# Patient Record
Sex: MALE | Race: WHITE | NOT HISPANIC OR LATINO | Employment: OTHER | URBAN - METROPOLITAN AREA
[De-identification: names, ages, dates, MRNs, and addresses within clinical notes are randomized per-mention and may not be internally consistent; named-entity substitution may affect disease eponyms.]

---

## 2023-08-18 ENCOUNTER — TRANSFERRED RECORDS (OUTPATIENT)
Dept: HEALTH INFORMATION MANAGEMENT | Facility: CLINIC | Age: 73
End: 2023-08-18

## 2023-09-18 ENCOUNTER — TELEPHONE (OUTPATIENT)
Dept: TRANSPLANT | Facility: CLINIC | Age: 73
End: 2023-09-18
Payer: MEDICARE

## 2023-09-18 DIAGNOSIS — C90.00 MULTIPLE MYELOMA, REMISSION STATUS UNSPECIFIED (H): Primary | ICD-10-CM

## 2023-10-18 ENCOUNTER — CARE COORDINATION (OUTPATIENT)
Dept: TRANSPLANT | Facility: CLINIC | Age: 73
End: 2023-10-18
Payer: MEDICARE

## 2023-10-18 NOTE — PROGRESS NOTES
[facility]MT [facility] Cell Therapy [facility]  RN Coordinator Pre-Visit Documentation      Dutch Mckeon is a 73 year old male who has been referred to the [facility] BMT and Cell Therapy Program for hematopoietic cell transplant or immune effector cell therapy.      Referring MD Name: Dr. Romero      Reason for referral: MM, auto transplant    Link to BMT & CT Program Algorithms      All relevant clinical notes, labs, imaging, and pathology may be reviewed in Epic Bookmarks under name: Micaela Oliveira      Patient Care Team         Relationship Specialty Notifications Start End    James Meyer MD PCP - General   10/17/23     Phone: [phone] Fax: [phone]         [address] [place] [address]              Micaela Oliveira RN

## 2023-10-19 PROBLEM — G95.89 SPINAL CORD MASS (H): Status: ACTIVE | Noted: 2023-08-12

## 2023-10-19 PROBLEM — C90.30 PLASMACYTOMA (H): Status: ACTIVE | Noted: 2023-08-12

## 2023-10-19 PROBLEM — E78.5 HYPERLIPIDEMIA: Status: ACTIVE | Noted: 2023-08-15

## 2023-10-19 PROBLEM — I10 HYPERTENSIVE DISORDER: Status: ACTIVE | Noted: 2023-08-12

## 2023-10-19 PROBLEM — C90.00 MULTIPLE MYELOMA (H): Status: ACTIVE | Noted: 2023-09-01

## 2023-10-19 PROBLEM — M10.9 GOUT: Status: ACTIVE | Noted: 2023-08-15

## 2023-10-19 PROBLEM — S24.109A INJURY OF THORACIC SPINAL CORD (H): Status: ACTIVE | Noted: 2023-08-15

## 2023-10-19 NOTE — PROGRESS NOTES
BMT Consultation    Dutch Mckeon is a 73 year old male referred by Dr. 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    Oncology History   Multiple myeloma (H)   8/18/2023 -  Cancer Staged    Staging form: Plasma Cell Myeloma and Disorders, AJCC 8th Edition  - Clinical stage from 8/18/2023: RISS Stage I (Beta-2-microglobulin (mg/L): 2, Albumin (g/dL): 3.9, ISS: Stage I, High-risk cytogenetics: Absent, LDH: Normal)     9/1/2023 Initial Diagnosis    Multiple myeloma (H)         Hematologic history:  Multiple myeloma RISS stage I, IgG lambda, standard risk cytogenetics.      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    The pt p/w B/L LE weakness and inability to bear his weight to his local ED on 8/15/23. He states that this was an acute progression of symptoms in his LEs, unable to walk or to bear weight within a day. His symptoms were a/w tinglings, numbness from waist down. After he went to the hospital, he also had both urinary and bowel incontinence. He states that the onset was he was playing golf 1 wk prior, but he recalls that his balance was not as normal since April. Looking back, he states that he has intermittent intercostal numbness/tinglings across his waist on the L side since February this yr, which he was treated with steroids prn and PT (however, due to the progressive nature of the symptoms, he had been scheduled to get and MRI, which did not happen because he ended up coming to the hospital).     He underwent w/up with MRI spine (8/12/2023) that showed a Large homogeneous enhancing multilobular spinous/paraspinous mass centered at the posterior elements of T6, with additional involvement of T5/T7, paraspinous and adjacent ribs,  transforaminal extension and epidural invasion/involvement. Mass effect results in severe spinal stenosis T5-upper T7 level as well as severe foraminal compromise T5-T6 and T6-T7 right more prominent than left, that was concerning for an aggressive/destructive neoplastic process with primary consideration of metastasis, or primary bone tumor including plasmacytoma, chordoma or lymphoma. The MRI also showed an abnormal nonenhancing intramedullary T2 hyperintensity/edema involves the T5-T7 level cord related to compressive effects of the soft tissue mass/neoplasm. He was treated with radiation, his symptoms got better and he established care with an hematologist/oncologist.     While he was in the hospital, he underwent BMBx on 8/21, that showed bone marrow involvement of plasma cell 10-15%, and neg for clonal chromosome abnl findings. FISH showed Positive for a trisomy result with gains of chromosomes 5, 9, and 15 suggesting hyperdiploidy, it was neg for remaining panel probes. Peripheral labs showed M protein of 1.95 IgG Lambda, . UPEP also positive for monoclonal protein, lambda LC of 21 with ratio of 0.04.     Currently, he states that his weakness in B/L LE have improved significantly (but has recovered about 80%), but he still has residual tingling and numbness in his feet (he had this his waist down, which have improved mostly) that makes him still unsteady. Due to this, he uses a walker currently.     At baseline, he used to walk 8-9 miles a day before all this happened. He used to play golf 5 days a wk.     ROS positive for constipation. The patient does not report any SOB, CP, abd pain/n/v/d, dysuria, joint pain, rash, or fever.     He states he has been tolerating his D-Rvd pretty well. No fever complication, no rash, no GI symptoms. Still eating well. His only symptom is some achyness around his ribs, which that not even bother him much.     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 He is schedule  to to get additional surgery on 11/3/2023.     Currently on metformin, and glipizide was just added after he was started on chemo regimen. Recent A1c of 8.3 in 8/14/23. He is ppx with bactrim and acyclovir.     referred by Dr. Brown    ASSESSMENT AND PLAN:  Standard risk multiple myeloma-Mr. Mckeon presented with a fairly dramatic complication of his multiple myeloma with impending cord compression.  Fortunately with aggressive upfront therapy and radiotherapy, he is regaining function and feels that his strength is almost returned to normal but he is continuing to use a walker due to unsteadiness.  He is tolerating therapy quite well and there is evidence based on monitoring labs that he is responding well.  Today we reviewed with him the role of high-dose melphalan and peripheral blood stem cell transplant.  We discussed the primary benefit of a prolonged period of disease-free and progression free survival that is typically associated with excellent quality life.  We discussed the downsides which would be significant chemotherapy toxicity affecting the GI tract, peripheral blood counts, with fatigue and malaise lasting approximately 4 to 6 weeks.  We discussed the logistics of collecting peripheral blood stem cells, and moving ahead to high-dose chemotherapy thereafter.  We also discussed the options which could include ongoing treatment with his current regimen followed by a maintenance approach.  After reviewing the risk and benefits, he indicated a wish to proceed.  We would recommend that he receive at least 4 months of his current regimen before proceeding.  We also discussed that this would align with the Janet holiday season and so it may be reasonable to continue his regimen through December with anticipated timing of stem cell collection followed by treatment in January.  By the end of the visit, we had answered his questions and addressed any concerns that he had.  We will hope to see him back in  January.    ROS:    10 point ROS neg other than the symptoms noted above in the HPI.        Past Medical History:   Diagnosis Date    Basal cell cancer     nose    Gout     Hyperlipidemia     Kidney stone     Type 2 diabetes mellitus without complications (H)        Past Surgical History:   Procedure Laterality Date    ENT SURGERY      tonsillectomy    EXCISE LESION HEAD Left 11/13/2015    Procedure: EXCISE LESION HEAD;  Surgeon: Amadeo Liu MD;  Location: [facility]    EXCISE LESION LOWER EXTREMITY  3/21/2014    Procedure: EXCISE LESION LOWER EXTREMITY;  EXCISION LESION RIGHT FOOT FOURTH WEB SPACE;  Surgeon: Amadeo Liu MD;  Location: [facility]    SOFT TISSUE SURGERY      mohs to nose       Fhx:  Father: lung cancer (was a smoker), skin Ca  Mother: perforated colon (non-Ca)  Brother: prostate Ca      Social History     Tobacco Use    Smoking status: Never   Substance Use Topics    Alcohol use: Yes    Drug use: No   Etoh: 2 glasses of wine      No Known Allergies     Current Outpatient Medications   Medication Sig Dispense Refill    ALLOPURINOL PO Take 50 mg by mouth daily      ASPIRIN EC PO Take 81 mg by mouth daily      ATORVASTATIN CALCIUM PO Take 1 tablet by mouth daily      Ezetimibe (ZETIA PO) Take 1 tablet by mouth daily      METFORMIN HCL PO Take 1,000 mg by mouth 2 times daily (with meals)       multivitamin, therapeutic with minerals (MULTI-VITAMIN) TABS Take 1 tablet by mouth daily      VITAMIN D, CHOLECALCIFEROL, PO Take 1,000 Units by mouth daily         KPS:  80    Physical Exam:     Physical Exam  Constitutional:       General: He is not in acute distress.     Appearance: Normal appearance. He is not ill-appearing.   HENT:      Nose: Nose normal. No congestion.      Mouth/Throat:      Mouth: Mucous membranes are moist.   Cardiovascular:      Rate and Rhythm: Normal rate and regular rhythm.      Pulses: Normal pulses.      Heart sounds: Normal heart sounds. No murmur  heard.  Pulmonary:      Effort: Pulmonary effort is normal.      Breath sounds: Normal breath sounds.   Musculoskeletal:         General: No swelling. Normal range of motion.   Skin:     General: Skin is warm.      Findings: No rash.   Neurological:      General: No focal deficit present.      Mental Status: He is alert.   Psychiatric:         Mood and Affect: Mood normal.         Behavior: Behavior normal.         Thought Content: Thought content normal.         Judgment: Judgment normal.            Labs, testing:     CBC on 8/12/23: WBC 5.1, Hb 12.3, plt 254  Beta2 microglobulin 2.03  Albumin 4.1    Lambda FLC 21.33  Kappa FLC 0.78  Ratio 0.04  M protein 1.95g  IGG 2677  IgM 102  IgA 62    10/3/2023  Lambda FLC 1.09  Kappa FLC 0.74  Ratio 0.68  M protein 0.27g      TEST & RESULT SUMMARY Chromosome Analysis:   Negative for a clonal chromosome abnormality.    Plasma Cell Dyscrasia FISH Panel:   Positive for a trisomy result with gains of chromosomes 5, 9, and 15 suggesting hyperdiploidy. Negative for remaining panel probes.       Bone marrow biopsy 8/21/2023       Final Diagnosis   BONE MARROW AND PERIPHERAL BLOOD:   1.  Plasma cell myeloma (WHO 2016)       a.  Bone marrow involvement:  10-15%       b.  Bone marrow cellularity:  50% (moderately hypercellular with maturing trilineage hematopoiesis)       c.  Peripheral blood:           - No circulating plasma cells           - Minimal normocytic anemia   2.  M-spike: 1.95 g/dl IgG lambda   3.  Chromosome and FISH (myeloma panel) studies pending; appended results to follow   4.  See comment   Electronically signed by Virginia Cummings MD on 8/21/2023 at 10:20 AM   Comment     Clinical correlation is required to evaluate for any evidence of myeloma-related end organ damage in order to distinguish between smoldering (asymptomatic) plasma cell myeloma and (symptomatic) plasma cell myeloma.      Chromosome Analysis:   Negative for a clonal chromosome  abnormality.     Plasma Cell Dyscrasia FISH Panel:   Positive for a trisomy result with gains of chromosomes 5, 9, and 15 suggesting hyperdiploidy. Negative for remaining panel probes.       TODAY'S ASSESSMENT BY SYSTEMS     Dutch understood the above assessment and recommendations.  Multiple questions answered.  No barriers to learning identified.       Known issues that I take into account for medical decisions, with salient changes to the plan considering these complexities noted above.    Patient Active Problem List   Diagnosis    AC separation, type 2    Diabetes mellitus without complication (H)    Gout    Hyperlipidemia    Hypertensive disorder    Injury of thoracic spinal cord (H)    Multiple myeloma (H)    Plasmacytoma (H)    Spinal cord mass (H)       Today's summary: He will continue 4-5 months of treatment and then return for HD-Idania and transplant    I spent 90 minutes in the care of this patient today, which included time necessary for preparation for the visit, obtaining history, ordering medications/tests/procedures as medically indicated, review of pertinent medical literature, counseling of the patient, communication of recommendations to the care team, and documentation time.    GABRIEL MEYERS MD  October 19, 2023        ------------------------------------------------------------------------------------------------------------------------------------------------    Patient Care Team         Relationship Specialty Notifications Start End    James Meyer MD PCP - General   10/17/23     Phone: [phone] Fax: [phone]         [address] [place] [address]

## 2023-10-20 ENCOUNTER — OFFICE VISIT (OUTPATIENT)
Dept: TRANSPLANT | Facility: CLINIC | Age: 73
End: 2023-10-20
Attending: INTERNAL MEDICINE
Payer: MEDICARE

## 2023-10-20 VITALS
DIASTOLIC BLOOD PRESSURE: 66 MMHG | SYSTOLIC BLOOD PRESSURE: 114 MMHG | RESPIRATION RATE: 20 BRPM | BODY MASS INDEX: 28.42 KG/M2 | TEMPERATURE: 98 F | WEIGHT: 203 LBS | OXYGEN SATURATION: 98 % | HEART RATE: 63 BPM | HEIGHT: 71 IN

## 2023-10-20 DIAGNOSIS — Z71.9 VISIT FOR COUNSELING: Primary | ICD-10-CM

## 2023-10-20 DIAGNOSIS — C90.00 MULTIPLE MYELOMA, REMISSION STATUS UNSPECIFIED (H): Primary | ICD-10-CM

## 2023-10-20 PROCEDURE — 99417 PROLNG OP E/M EACH 15 MIN: CPT

## 2023-10-20 PROCEDURE — G0463 HOSPITAL OUTPT CLINIC VISIT: HCPCS

## 2023-10-20 PROCEDURE — 99205 OFFICE O/P NEW HI 60 MIN: CPT

## 2023-10-20 RX ORDER — PANTOPRAZOLE SODIUM 40 MG/1
TABLET, DELAYED RELEASE ORAL
COMMUNITY
Start: 2023-08-23 | End: 2024-01-10

## 2023-10-20 RX ORDER — ACYCLOVIR 400 MG/1
1 TABLET ORAL 2 TIMES DAILY
COMMUNITY
Start: 2023-08-23

## 2023-10-20 RX ORDER — ACETAMINOPHEN 500 MG
TABLET ORAL
COMMUNITY
Start: 2023-08-23 | End: 2024-01-10

## 2023-10-20 RX ORDER — DEXAMETHASONE 4 MG/1
TABLET ORAL
COMMUNITY
Start: 2023-09-08 | End: 2024-01-10

## 2023-10-20 RX ORDER — BORTEZOMIB 3.5 MG/1
INJECTION, POWDER, LYOPHILIZED, FOR SOLUTION INTRAVENOUS; SUBCUTANEOUS ONCE
COMMUNITY
End: 2024-01-10

## 2023-10-20 RX ORDER — GLIPIZIDE 5 MG/1
TABLET ORAL
COMMUNITY
Start: 2023-08-23

## 2023-10-20 RX ORDER — SULFAMETHOXAZOLE AND TRIMETHOPRIM 200; 40 MG/5ML; MG/5ML
SUSPENSION ORAL
COMMUNITY
Start: 2023-08-23

## 2023-10-20 RX ORDER — LENALIDOMIDE 25 MG/1
CAPSULE ORAL
COMMUNITY
End: 2024-01-10

## 2023-10-20 RX ORDER — DIPHENHYDRAMINE HCL 25 MG
CAPSULE ORAL
COMMUNITY
Start: 2023-08-23 | End: 2024-01-10

## 2023-10-20 RX ORDER — GABAPENTIN 100 MG/1
100 CAPSULE ORAL 3 TIMES DAILY
COMMUNITY
Start: 2023-08-23

## 2023-10-20 ASSESSMENT — PAIN SCALES - GENERAL: PAINLEVEL: NO PAIN (0)

## 2023-10-20 NOTE — LETTER
10/20/2023         RE: Dutch Mckeon  [address] [place] [address]        Dear Colleague,    Thank you for referring your patient, Dutch Mckeon, to the [facility] [place]. Please see a copy of my visit note below.           BMT Consultation    Dutch Mckeon is a 73 year old male referred by Dr. 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    Oncology History   Multiple myeloma (H)   8/18/2023 -  Cancer Staged    Staging form: Plasma Cell Myeloma and Disorders, AJCC 8th Edition  - Clinical stage from 8/18/2023: RISS Stage I (Beta-2-microglobulin (mg/L): 2, Albumin (g/dL): 3.9, ISS: Stage I, High-risk cytogenetics: Absent, LDH: Normal)     9/1/2023 Initial Diagnosis    Multiple myeloma (H)         Hematologic history:  Multiple myeloma RISS stage I, IgG lambda, standard risk cytogenetics.      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    The pt p/w B/L LE weakness and inability to bear his weight to his local ED on 8/15/23. He states that this was an acute progression of symptoms in his LEs, unable to walk or to bear weight within a day. His symptoms were a/w tinglings, numbness from waist down. After he went to the hospital, he also had both urinary and bowel incontinence. He states that the onset was he was playing golf 1 wk prior, but he recalls that his balance was not as normal since April. Looking back, he states that he has intermittent intercostal numbness/tinglings across his waist on the L side since February this yr, which he was treated with steroids prn and PT (however, due to the progressive nature of the symptoms, he had been scheduled to get and MRI, which did not happen because  he ended up coming to the hospital).     He underwent w/up with MRI spine (8/12/2023) that showed a Large homogeneous enhancing multilobular spinous/paraspinous mass centered at the posterior elements of T6, with additional involvement of T5/T7, paraspinous and adjacent ribs, transforaminal extension and epidural invasion/involvement. Mass effect results in severe spinal stenosis T5-upper T7 level as well as severe foraminal compromise T5-T6 and T6-T7 right more prominent than left, that was concerning for an aggressive/destructive neoplastic process with primary consideration of metastasis, or primary bone tumor including plasmacytoma, chordoma or lymphoma. The MRI also showed an abnormal nonenhancing intramedullary T2 hyperintensity/edema involves the T5-T7 level cord related to compressive effects of the soft tissue mass/neoplasm. He was treated with radiation, his symptoms got better and he established care with an hematologist/oncologist.     While he was in the hospital, he underwent BMBx on 8/21, that showed bone marrow involvement of plasma cell 10-15%, and neg for clonal chromosome abnl findings. FISH showed Positive for a trisomy result with gains of chromosomes 5, 9, and 15 suggesting hyperdiploidy, it was neg for remaining panel probes. Peripheral labs showed M protein of 1.95 IgG Lambda, . UPEP also positive for monoclonal protein, lambda LC of 21 with ratio of 0.04.     Currently, he states that his weakness in B/L LE have improved significantly (but has recovered about 80%), but he still has residual tingling and numbness in his feet (he had this his waist down, which have improved mostly) that makes him still unsteady. Due to this, he uses a walker currently.     At baseline, he used to walk 8-9 miles a day before all this happened. He used to play golf 5 days a wk.     ROS positive for constipation. The patient does not report any SOB, CP, abd pain/n/v/d, dysuria, joint pain, rash, or fever.     He  states he has been tolerating his D-Rvd pretty well. No fever complication, no rash, no GI symptoms. Still eating well. His only symptom is some achyness around his ribs, which that not even bother him much.     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 He is schedule to to get additional surgery on 11/3/2023.     Currently on metformin, and glipizide was just added after he was started on chemo regimen. Recent A1c of 8.3 in 8/14/23. He is ppx with bactrim and acyclovir.     referred by Dr. Brown    ASSESSMENT AND PLAN:  Standard risk multiple myeloma-Mr. Mckeon presented with a fairly dramatic complication of his multiple myeloma with impending cord compression.  Fortunately with aggressive upfront therapy and radiotherapy, he is regaining function and feels that his strength is almost returned to normal but he is continuing to use a walker due to unsteadiness.  He is tolerating therapy quite well and there is evidence based on monitoring labs that he is responding well.  Today we reviewed with him the role of high-dose melphalan and peripheral blood stem cell transplant.  We discussed the primary benefit of a prolonged period of disease-free and progression free survival that is typically associated with excellent quality life.  We discussed the downsides which would be significant chemotherapy toxicity affecting the GI tract, peripheral blood counts, with fatigue and malaise lasting approximately 4 to 6 weeks.  We discussed the logistics of collecting peripheral blood stem cells, and moving ahead to high-dose chemotherapy thereafter.  We also discussed the options which could include ongoing treatment with his current regimen followed by a maintenance approach.  After reviewing the risk and benefits, he indicated a wish to proceed.  We would recommend that he receive at least 4 months of his current regimen before proceeding.  We also discussed that this would align with the [place] holiday season and  so it may be reasonable to continue his regimen through December with anticipated timing of stem cell collection followed by treatment in January.  By the end of the visit, we had answered his questions and addressed any concerns that he had.  We will hope to see him back in January.    ROS:    10 point ROS neg other than the symptoms noted above in the HPI.        Past Medical History:   Diagnosis Date    Basal cell cancer     nose    Gout     Hyperlipidemia     Kidney stone     Type 2 diabetes mellitus without complications (H)        Past Surgical History:   Procedure Laterality Date    ENT SURGERY      tonsillectomy    EXCISE LESION HEAD Left 11/13/2015    Procedure: EXCISE LESION HEAD;  Surgeon: Amadeo Liu MD;  Location: [facility]    EXCISE LESION LOWER EXTREMITY  3/21/2014    Procedure: EXCISE LESION LOWER EXTREMITY;  EXCISION LESION RIGHT FOOT FOURTH WEB SPACE;  Surgeon: Amadeo Liu MD;  Location: [facility]    SOFT TISSUE SURGERY      mohs to nose       Fhx:  Father: lung cancer (was a smoker), skin Ca  Mother: perforated colon (non-Ca)  Brother: prostate Ca      Social History     Tobacco Use    Smoking status: Never   Substance Use Topics    Alcohol use: Yes    Drug use: No   Etoh: 2 glasses of wine      No Known Allergies     Current Outpatient Medications   Medication Sig Dispense Refill    ALLOPURINOL PO Take 50 mg by mouth daily      ASPIRIN EC PO Take 81 mg by mouth daily      ATORVASTATIN CALCIUM PO Take 1 tablet by mouth daily      Ezetimibe (ZETIA PO) Take 1 tablet by mouth daily      METFORMIN HCL PO Take 1,000 mg by mouth 2 times daily (with meals)       multivitamin, therapeutic with minerals (MULTI-VITAMIN) TABS Take 1 tablet by mouth daily      VITAMIN D, CHOLECALCIFEROL, PO Take 1,000 Units by mouth daily         KPS:  80    Physical Exam:     Physical Exam  Constitutional:       General: He is not in acute distress.     Appearance: Normal appearance. He is not  ill-appearing.   HENT:      Nose: Nose normal. No congestion.      Mouth/Throat:      Mouth: Mucous membranes are moist.   Cardiovascular:      Rate and Rhythm: Normal rate and regular rhythm.      Pulses: Normal pulses.      Heart sounds: Normal heart sounds. No murmur heard.  Pulmonary:      Effort: Pulmonary effort is normal.      Breath sounds: Normal breath sounds.   Musculoskeletal:         General: No swelling. Normal range of motion.   Skin:     General: Skin is warm.      Findings: No rash.   Neurological:      General: No focal deficit present.      Mental Status: He is alert.   Psychiatric:         Mood and Affect: Mood normal.         Behavior: Behavior normal.         Thought Content: Thought content normal.         Judgment: Judgment normal.            Labs, testing:     CBC on 8/12/23: WBC 5.1, Hb 12.3, plt 254  Beta2 microglobulin 2.03  Albumin 4.1    Lambda FLC 21.33  Kappa FLC 0.78  Ratio 0.04  M protein 1.95g  IGG 2677  IgM 102  IgA 62    10/3/2023  Lambda FLC 1.09  Kappa FLC 0.74  Ratio 0.68  M protein 0.27g      TEST & RESULT SUMMARY Chromosome Analysis:   Negative for a clonal chromosome abnormality.    Plasma Cell Dyscrasia FISH Panel:   Positive for a trisomy result with gains of chromosomes 5, 9, and 15 suggesting hyperdiploidy. Negative for remaining panel probes.       Bone marrow biopsy 8/21/2023       Final Diagnosis   BONE MARROW AND PERIPHERAL BLOOD:   1.  Plasma cell myeloma (WHO 2016)       a.  Bone marrow involvement:  10-15%       b.  Bone marrow cellularity:  50% (moderately hypercellular with maturing trilineage hematopoiesis)       c.  Peripheral blood:           - No circulating plasma cells           - Minimal normocytic anemia   2.  M-spike: 1.95 g/dl IgG lambda   3.  Chromosome and FISH (myeloma panel) studies pending; appended results to follow   4.  See comment   Electronically signed by Virginia Cummings MD on 8/21/2023 at 10:20 AM   Comment     Clinical  correlation is required to evaluate for any evidence of myeloma-related end organ damage in order to distinguish between smoldering (asymptomatic) plasma cell myeloma and (symptomatic) plasma cell myeloma.      Chromosome Analysis:   Negative for a clonal chromosome abnormality.     Plasma Cell Dyscrasia FISH Panel:   Positive for a trisomy result with gains of chromosomes 5, 9, and 15 suggesting hyperdiploidy. Negative for remaining panel probes.       TODAY'S ASSESSMENT BY SYSTEMS     Dutch understood the above assessment and recommendations.  Multiple questions answered.  No barriers to learning identified.       Known issues that I take into account for medical decisions, with salient changes to the plan considering these complexities noted above.    Patient Active Problem List   Diagnosis    AC separation, type 2    Diabetes mellitus without complication (H)    Gout    Hyperlipidemia    Hypertensive disorder    Injury of thoracic spinal cord (H)    Multiple myeloma (H)    Plasmacytoma (H)    Spinal cord mass (H)       Today's summary: He will continue 4-5 months of treatment and then return for HD-Idania and transplant    I spent 90 minutes in the care of this patient today, which included time necessary for preparation for the visit, obtaining history, ordering medications/tests/procedures as medically indicated, review of pertinent medical literature, counseling of the patient, communication of recommendations to the care team, and documentation time.    GABRIEL MEYERS MD  October 19, 2023        ------------------------------------------------------------------------------------------------------------------------------------------------    Patient Care Team         Relationship Specialty Notifications Start End    James Meyer MD PCP - General   10/17/23     Phone: [phone] Fax: [phone]         [address] [place] [address]                     BMT Consultation    Dutch CASTAÑEDA  Mike is a 73 year old male referred by Dr. 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 He is in our BMT clinic for initial consultation for BMT.     Oncology History   Multiple myeloma (H)   8/18/2023 -  Cancer Staged    Staging form: Plasma Cell Myeloma and Disorders, AJCC 8th Edition  - Clinical stage from 8/18/2023: RISS Stage I (Beta-2-microglobulin (mg/L): 2, Albumin (g/dL): 3.9, ISS: Stage I, High-risk cytogenetics: Absent, LDH: Normal)     9/1/2023 Initial Diagnosis    Multiple myeloma (H)         Hematologic history:  Multiple myeloma RISS stage I, IgG lambda, standard risk cytogenetics.      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    The pt p/w B/L LE weakness and inability to bear his weight to his local ED on 8/15/23. He states that this was an acute progression of symptoms in his LEs, unable to walk or to bear weight within a day. His symptoms were a/w tinglings, numbness from waist down. After he went to the hospital, he also had both urinary and bowel incontinence. He states that the onset was he was playing golf 1 wk prior, but he recalls that his balance was not as normal since April. Looking back, he states that he has intermittent intercostal numbness/tinglings across his waist on the L side since February this yr, which he was treated with steroids prn and PT (however, due to the progressive nature of the symptoms, he had been scheduled to get and MRI, which did not happen because he ended up coming to the hospital).     He underwent w/up with MRI spine (8/12/2023) that showed a Large homogeneous enhancing multilobular spinous/paraspinous mass centered at  the posterior elements of T6, with additional involvement of T5/T7, paraspinous and adjacent ribs, transforaminal extension and epidural invasion/involvement. Mass effect results in severe spinal stenosis T5-upper T7 level as well as severe foraminal compromise T5-T6 and T6-T7 right more prominent than left, that was concerning for an aggressive/destructive neoplastic process with primary consideration of metastasis, or primary bone tumor including plasmacytoma, chordoma or lymphoma. The MRI also showed an abnormal nonenhancing intramedullary T2 hyperintensity/edema involves the T5-T7 level cord related to compressive effects of the soft tissue mass/neoplasm. He was treated with radiation and his symptoms got better, and was discharge to establish care with an hematologist/oncologist.    While he was in the hospital, he underwent BMBx on 8/21, that showed bone marrow involvement of plasma cell 10-15%, and neg for clonal chromosome abnl findings. FISH showed Positive for a trisomy result with gains of chromosomes 5, 9, and 15 suggesting hyperdiploidy, it was neg for remaining panel probes. Peripheral labs showed M protein of 1.95 IgG Lambda, FLC 21 with ratio of 0.04. UPEP also positive for monoclonal protein.    He has been seen by Dr. Brown at [facility]. He was started on D-RVd on 9/12, which he completed 2 cycles so far. He states he has been tolerating his D-Rvd pretty well. No fever complication, no rash, no GI symptoms. Still eating well. His only symptom is some achyness around his ribs, which that not even bother him much.     Currently, he states that his weakness in B/L LE have improved significantly (but has recovered about 80%), but he still has residual tingling and numbness in his feet (he had this his waist down, which have improved mostly) that makes him still unsteady. Due to this, he uses a walker currently.     At baseline, he used to walk 8-9 miles a day before all this happened. He used to  play golf 5 days a wk.     ROS positive for constipation. The patient does not report any SOB, CP, abd pain/n/v/d, dysuria, joint pain, rash, or fever.     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 He is schedule to to get additional surgery on 11/3/2023. He also has a h/o basal cell Ca s/p resection, but this was > 10 yrs ago.     Currently he is on metformin, and glipizide was just added after he was started on chemo regimen. Recent A1c of 8.3 in 8/14/23. He is ppx with bactrim and acyclovir.     ASSESSMENT AND PLAN:    #Multiple myeloma, standard risk  #Melanoma of R buttock area  #B/L LE weaknesses 2/2 T5-T7 spinal stenosis s/p radiation treatment    The pt has standard risk MM, s/p 2 cycles of D-RVd. Per his report, he has tolerated them well without any significant side effects and his SPEP and Lambda FLC have improved. He does have residual LE B/L weaknesses and balance issue due to his LE tinglings/numbness due to his T5-T7 spinal stenosis s/p XRT, but we think he is fit and that he is a good candidate for auto PBSCT. We are hoping that he can complete 4-5 cycles of D-RVd, and then proceed with cell collection, melphalan chemo, and then auto transplant.     The pt is receiving a melanoma surgery next month on 11/3/23, for a R buttock lesion that was biopied. Per his report, it seems to be a fairly small lesion (about 1cm) and that the resection may just treat it completely. This is more of a priority and that this needs to be taken care of prior to his auto transplant. If there is no additional treatment needed after the resection and path results is back, he can proceed with auto transplant. If he needs additional treatment after resection, his melanoma mngt needs to be prioritize first.      Plan)  -cont D-RVd for 4-5 cycles  -then plan to proceed with auto PBSCT, pending melanoma surgery and pathology result  -If any additional melanoma treatment is needed besides his upcoming surgery/resection on  11/3/23, pt to prioritize his melanoma treatment first.     I spent 45 minutes in the care of this patient today, which included time necessary for preparation for the visit, obtaining history, ordering medications/tests/procedures as medically indicated, review of pertinent medical literature, counseling of the patient, communication of recommendations to the care team, and documentation time.    The above plan was discussed with Dr. James, who agrees with the assessment and plan.     Thank you for allowing me to participate in the care of this patient. Please do not hesitate to contact me if there are any concerns or questions.     Rodrigo Dominguez MD  Hem/onc fellow  Division of Hematology, Oncology, and Transplantation  [facility]      GABRIEL JAMES MD  October 20, 2023      ROS:    10 point ROS neg other than the symptoms noted above in the HPI.        Past Medical History:   Diagnosis Date    Basal cell cancer     nose    Gout     Hyperlipidemia     Kidney stone     Type 2 diabetes mellitus without complications (H)        Past Surgical History:   Procedure Laterality Date    ENT SURGERY      tonsillectomy    EXCISE LESION HEAD Left 11/13/2015    Procedure: EXCISE LESION HEAD;  Surgeon: Amadeo Liu MD;  Location: [facility]    EXCISE LESION LOWER EXTREMITY  3/21/2014    Procedure: EXCISE LESION LOWER EXTREMITY;  EXCISION LESION RIGHT FOOT FOURTH WEB SPACE;  Surgeon: Amadeo Liu MD;  Location: [facility]    SOFT TISSUE SURGERY      mohs to nose       Fhx:  Father: lung cancer (was a smoker), skin Ca  Mother: perforated colon (non-Ca)  Brother: prostate Ca      Social History     Tobacco Use    Smoking status: Never   Substance Use Topics    Alcohol use: Yes    Drug use: No   Etoh: 2 glasses of wine      No Known Allergies     Current Outpatient Medications   Medication Sig Dispense Refill    acetaminophen (TYLENOL) 500 MG tablet Take 1000 mg by mouth 1-3 hours before first three  doses of daratumumab chemotherapy on days 1, 8, and 15 of cycle 1. Max acetaminophen dose: 4000 mg in 24 hrs.      acyclovir (ZOVIRAX) 400 MG tablet Take 1 tablet by mouth 2 times daily      ALLOPURINOL PO Take 50 mg by mouth daily      ASPIRIN EC PO Take 81 mg by mouth daily      ATORVASTATIN CALCIUM PO Take 1 tablet by mouth daily      bortezomib (VELCADE) 3.5 MG injection Inject into the vein once      dexAMETHasone (DECADRON) 4 MG tablet Take 20 mg by mouth once daily on days 1, 8 and 15 of each 21 day cycle. On cycle 1, take 1-3 hours before first three doses of daratumumab chemotherapy.      diphenhydrAMINE (BENADRYL) 25 MG capsule       Ezetimibe (ZETIA PO) Take 1 tablet by mouth daily      gabapentin (NEURONTIN) 100 MG capsule Take 100 mg by mouth      glipiZIDE (GLUCOTROL) 5 MG tablet       LENalidomide (REVLIMID) 25 MG CAPS capsule       METFORMIN HCL PO Take 1,000 mg by mouth 2 times daily (with meals)       multivitamin, therapeutic with minerals (MULTI-VITAMIN) TABS Take 1 tablet by mouth daily      pantoprazole (PROTONIX) 40 MG EC tablet       sulfamethoxazole-trimethoprim (BACTRIM/SEPTRA) 8 mg/mL suspension       UNABLE TO FIND MEDICATION NAME: daratumumab      VITAMIN D, CHOLECALCIFEROL, PO Take 1,000 Units by mouth daily         KPS:  80-90    Physical Exam:     Physical Exam  Constitutional:       Appearance: Normal appearance.   HENT:      Head: Normocephalic and atraumatic.   Eyes:      Pupils: Pupils are equal, round, and reactive to light.   Cardiovascular:      Rate and Rhythm: Normal rate and regular rhythm.      Heart sounds: Normal heart sounds.   Pulmonary:      Effort: Pulmonary effort is normal. No respiratory distress.      Breath sounds: Normal breath sounds. No stridor. No wheezing, rhonchi or rales.   Abdominal:      General: Abdomen is flat. Bowel sounds are normal. There is no distension.      Palpations: Abdomen is soft. There is no mass.      Tenderness: There is no abdominal  tenderness.   Musculoskeletal:         General: Normal range of motion.      Cervical back: Normal range of motion.   Skin:     General: Skin is warm and dry.   Neurological:      General: No focal deficit present.      Mental Status: He is alert.      Comments: MMT 4+/5 in LEs B/L  Sensory to light touch NL, but c/o surface numbness in B/L LE.    Psychiatric:         Mood and Affect: Mood normal.            Labs, testing:     CBC on 8/12/23: WBC 5.1, Hb 12.3, plt 254  Beta2 microglobulin 2.03  Albumin 4.1    Lambda FLC 21.33  Kappa FLC 0.78  Ratio 0.04  M protein 1.95g  IGG 2677  IgM 102  IgA 62    10/3/2023  Lambda FLC 1.09  Kappa FLC 0.74  Ratio 0.68  M protein 0.27g      TEST & RESULT SUMMARY Chromosome Analysis:   Negative for a clonal chromosome abnormality.    Plasma Cell Dyscrasia FISH Panel:   Positive for a trisomy result with gains of chromosomes 5, 9, and 15 suggesting hyperdiploidy. Negative for remaining panel probes.       Bone marrow biopsy 8/21/2023       Final Diagnosis   BONE MARROW AND PERIPHERAL BLOOD:   1.  Plasma cell myeloma (WHO 2016)       a.  Bone marrow involvement:  10-15%       b.  Bone marrow cellularity:  50% (moderately hypercellular with maturing trilineage hematopoiesis)       c.  Peripheral blood:           - No circulating plasma cells           - Minimal normocytic anemia   2.  M-spike: 1.95 g/dl IgG lambda   3.  Chromosome and FISH (myeloma panel) studies pending; appended results to follow   4.  See comment   Electronically signed by Virginia Cummings MD on 8/21/2023 at 10:20 AM   Comment     Clinical correlation is required to evaluate for any evidence of myeloma-related end organ damage in order to distinguish between smoldering (asymptomatic) plasma cell myeloma and (symptomatic) plasma cell myeloma.      Chromosome Analysis:   Negative for a clonal chromosome abnormality.     Plasma Cell Dyscrasia FISH Panel:   Positive for a trisomy result with gains of  chromosomes 5, 9, and 15 suggesting hyperdiploidy. Negative for remaining panel probes.     Patient Active Problem List   Diagnosis    AC separation, type 2    Diabetes mellitus without complication (H)    Gout    Hyperlipidemia    Hypertensive disorder    Injury of thoracic spinal cord (H)    Multiple myeloma (H)    Plasmacytoma (H)    Spinal cord mass (H)           ------------------------------------------------------------------------------------------------------------------------------------------------    Patient Care Team         Relationship Specialty Notifications Start End    James Meyer MD PCP - General   10/17/23     Phone: [phone] Fax: [phone]         [address] [place] [address]                 BMT Auto Cell Therapy

## 2023-10-20 NOTE — PROGRESS NOTES
Blood and Marrow Transplant - New Evaluation Appointment    Spoke with Marshal portillo Viviane, patient's spouse, following visit with Dr. James. I explained the role of the nurse coordinator throughout the process, as well as general time line and expectations for next steps. We discussed the necessity of a caregiver and the program's proximity requirements. All questions were answered.     Plan: Autologous Transplant Myeloma-Outpatient, pending completion of 4-5 cycles of therapy    Timeline Notes: After [place] time     Contact information provided for :  yes      Auto for MM/Amyloidosis:  Outpatient Infusion: Yes    Phase Status updated: yes

## 2023-10-20 NOTE — PROGRESS NOTES
BMT Consultation    Dutch Mckeon is a 73 year old male referred by Dr. 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 He is in our BMT clinic for initial consultation for BMT.     Oncology History   Multiple myeloma (H)   8/18/2023 -  Cancer Staged    Staging form: Plasma Cell Myeloma and Disorders, AJCC 8th Edition  - Clinical stage from 8/18/2023: RISS Stage I (Beta-2-microglobulin (mg/L): 2, Albumin (g/dL): 3.9, ISS: Stage I, High-risk cytogenetics: Absent, LDH: Normal)     9/1/2023 Initial Diagnosis    Multiple myeloma (H)         Hematologic history:  Multiple myeloma RISS stage I, IgG lambda, standard risk cytogenetics.      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    The pt p/w B/L LE weakness and inability to bear his weight to his local ED on 8/15/23. He states that this was an acute progression of symptoms in his LEs, unable to walk or to bear weight within a day. His symptoms were a/w tinglings, numbness from waist down. After he went to the hospital, he also had both urinary and bowel incontinence. He states that the onset was he was playing golf 1 wk prior, but he recalls that his balance was not as normal since April. Looking back, he states that he has intermittent intercostal numbness/tinglings across his waist on the L side since February this yr, which he was treated with steroids prn and PT (however, due to the progressive nature of the symptoms, he had been scheduled to get and MRI, which did not happen because he ended up coming to the hospital).     He underwent w/up with MRI spine (8/12/2023) that showed a Large homogeneous enhancing multilobular  spinous/paraspinous mass centered at the posterior elements of T6, with additional involvement of T5/T7, paraspinous and adjacent ribs, transforaminal extension and epidural invasion/involvement. Mass effect results in severe spinal stenosis T5-upper T7 level as well as severe foraminal compromise T5-T6 and T6-T7 right more prominent than left, that was concerning for an aggressive/destructive neoplastic process with primary consideration of metastasis, or primary bone tumor including plasmacytoma, chordoma or lymphoma. The MRI also showed an abnormal nonenhancing intramedullary T2 hyperintensity/edema involves the T5-T7 level cord related to compressive effects of the soft tissue mass/neoplasm. He was treated with radiation and his symptoms got better, and was discharge to establish care with an hematologist/oncologist.    While he was in the hospital, he underwent BMBx on 8/21, that showed bone marrow involvement of plasma cell 10-15%, and neg for clonal chromosome abnl findings. FISH showed Positive for a trisomy result with gains of chromosomes 5, 9, and 15 suggesting hyperdiploidy, it was neg for remaining panel probes. Peripheral labs showed M protein of 1.95 IgG Lambda, FLC 21 with ratio of 0.04. UPEP also positive for monoclonal protein.    He has been seen by Dr. Brown at [facility]. He was started on D-RVd on 9/12, which he completed 2 cycles so far. He states he has been tolerating his D-Rvd pretty well. No fever complication, no rash, no GI symptoms. Still eating well. His only symptom is some achyness around his ribs, which that not even bother him much.     Currently, he states that his weakness in B/L LE have improved significantly (but has recovered about 80%), but he still has residual tingling and numbness in his feet (he had this his waist down, which have improved mostly) that makes him still unsteady. Due to this, he uses a walker currently.     At baseline, he used to walk 8-9 miles a day  before all this happened. He used to play golf 5 days a wk.     ROS positive for constipation. The patient does not report any SOB, CP, abd pain/n/v/d, dysuria, joint pain, rash, or fever.     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 He is schedule to to get additional surgery on 11/3/2023. He also has a h/o basal cell Ca s/p resection, but this was > 10 yrs ago.     Currently he is on metformin, and glipizide was just added after he was started on chemo regimen. Recent A1c of 8.3 in 8/14/23. He is ppx with bactrim and acyclovir.     ASSESSMENT AND PLAN:    #Multiple myeloma, standard risk  #Melanoma of R buttock area  #B/L LE weaknesses 2/2 T5-T7 spinal stenosis s/p radiation treatment    The pt has standard risk MM, s/p 2 cycles of D-RVd. Per his report, he has tolerated them well without any significant side effects and his SPEP and Lambda FLC have improved. He does have residual LE B/L weaknesses and balance issue due to his LE tinglings/numbness due to his T5-T7 spinal stenosis s/p XRT, but we think he is fit and that he is a good candidate for auto PBSCT. We are hoping that he can complete 4-5 cycles of D-RVd, and then proceed with cell collection, melphalan chemo, and then auto transplant.     The pt is receiving a melanoma surgery next month on 11/3/23, for a R buttock lesion that was biopied. Per his report, it seems to be a fairly small lesion (about 1cm) and that the resection may just treat it completely. This is more of a priority and that this needs to be taken care of prior to his auto transplant. If there is no additional treatment needed after the resection and path results is back, he can proceed with auto transplant. If he needs additional treatment after resection, his melanoma mngt needs to be prioritize first.      Plan)  -cont D-RVd for 4-5 cycles  -then plan to proceed with auto PBSCT, pending melanoma surgery and pathology result  -If any additional melanoma treatment is needed  besides his upcoming surgery/resection on 11/3/23, pt to prioritize his melanoma treatment first.     I spent 45 minutes in the care of this patient today, which included time necessary for preparation for the visit, obtaining history, ordering medications/tests/procedures as medically indicated, review of pertinent medical literature, counseling of the patient, communication of recommendations to the care team, and documentation time.    The above plan was discussed with Dr. James, who agrees with the assessment and plan.     Thank you for allowing me to participate in the care of this patient. Please do not hesitate to contact me if there are any concerns or questions.     Rodrigo Dominguez MD  Hem/onc fellow  Division of Hematology, Oncology, and Transplantation  [facility]      GABRIEL JAMES MD  October 20, 2023      ROS:    10 point ROS neg other than the symptoms noted above in the HPI.        Past Medical History:   Diagnosis Date    Basal cell cancer     nose    Gout     Hyperlipidemia     Kidney stone     Type 2 diabetes mellitus without complications (H)        Past Surgical History:   Procedure Laterality Date    ENT SURGERY      tonsillectomy    EXCISE LESION HEAD Left 11/13/2015    Procedure: EXCISE LESION HEAD;  Surgeon: Amadeo Liu MD;  Location: [facility]    EXCISE LESION LOWER EXTREMITY  3/21/2014    Procedure: EXCISE LESION LOWER EXTREMITY;  EXCISION LESION RIGHT FOOT FOURTH WEB SPACE;  Surgeon: Amadeo Liu MD;  Location: [facility]    SOFT TISSUE SURGERY      mohs to nose       Fhx:  Father: lung cancer (was a smoker), skin Ca  Mother: perforated colon (non-Ca)  Brother: prostate Ca      Social History     Tobacco Use    Smoking status: Never   Substance Use Topics    Alcohol use: Yes    Drug use: No   Etoh: 2 glasses of wine      No Known Allergies     Current Outpatient Medications   Medication Sig Dispense Refill    acetaminophen (TYLENOL) 500 MG tablet Take 1000  mg by mouth 1-3 hours before first three doses of daratumumab chemotherapy on days 1, 8, and 15 of cycle 1. Max acetaminophen dose: 4000 mg in 24 hrs.      acyclovir (ZOVIRAX) 400 MG tablet Take 1 tablet by mouth 2 times daily      ALLOPURINOL PO Take 50 mg by mouth daily      ASPIRIN EC PO Take 81 mg by mouth daily      ATORVASTATIN CALCIUM PO Take 1 tablet by mouth daily      bortezomib (VELCADE) 3.5 MG injection Inject into the vein once      dexAMETHasone (DECADRON) 4 MG tablet Take 20 mg by mouth once daily on days 1, 8 and 15 of each 21 day cycle. On cycle 1, take 1-3 hours before first three doses of daratumumab chemotherapy.      diphenhydrAMINE (BENADRYL) 25 MG capsule       Ezetimibe (ZETIA PO) Take 1 tablet by mouth daily      gabapentin (NEURONTIN) 100 MG capsule Take 100 mg by mouth      glipiZIDE (GLUCOTROL) 5 MG tablet       LENalidomide (REVLIMID) 25 MG CAPS capsule       METFORMIN HCL PO Take 1,000 mg by mouth 2 times daily (with meals)       multivitamin, therapeutic with minerals (MULTI-VITAMIN) TABS Take 1 tablet by mouth daily      pantoprazole (PROTONIX) 40 MG EC tablet       sulfamethoxazole-trimethoprim (BACTRIM/SEPTRA) 8 mg/mL suspension       UNABLE TO FIND MEDICATION NAME: daratumumab      VITAMIN D, CHOLECALCIFEROL, PO Take 1,000 Units by mouth daily         KPS:  80-90    Physical Exam:     Physical Exam  Constitutional:       Appearance: Normal appearance.   HENT:      Head: Normocephalic and atraumatic.   Eyes:      Pupils: Pupils are equal, round, and reactive to light.   Cardiovascular:      Rate and Rhythm: Normal rate and regular rhythm.      Heart sounds: Normal heart sounds.   Pulmonary:      Effort: Pulmonary effort is normal. No respiratory distress.      Breath sounds: Normal breath sounds. No stridor. No wheezing, rhonchi or rales.   Abdominal:      General: Abdomen is flat. Bowel sounds are normal. There is no distension.      Palpations: Abdomen is soft. There is no mass.       Tenderness: There is no abdominal tenderness.   Musculoskeletal:         General: Normal range of motion.      Cervical back: Normal range of motion.   Skin:     General: Skin is warm and dry.   Neurological:      General: No focal deficit present.      Mental Status: He is alert.      Comments: MMT 4+/5 in LEs B/L  Sensory to light touch NL, but c/o surface numbness in B/L LE.    Psychiatric:         Mood and Affect: Mood normal.            Labs, testing:     CBC on 8/12/23: WBC 5.1, Hb 12.3, plt 254  Beta2 microglobulin 2.03  Albumin 4.1    Lambda FLC 21.33  Kappa FLC 0.78  Ratio 0.04  M protein 1.95g  IGG 2677  IgM 102  IgA 62    10/3/2023  Lambda FLC 1.09  Kappa FLC 0.74  Ratio 0.68  M protein 0.27g      TEST & RESULT SUMMARY Chromosome Analysis:   Negative for a clonal chromosome abnormality.    Plasma Cell Dyscrasia FISH Panel:   Positive for a trisomy result with gains of chromosomes 5, 9, and 15 suggesting hyperdiploidy. Negative for remaining panel probes.       Bone marrow biopsy 8/21/2023       Final Diagnosis   BONE MARROW AND PERIPHERAL BLOOD:   1.  Plasma cell myeloma (WHO 2016)       a.  Bone marrow involvement:  10-15%       b.  Bone marrow cellularity:  50% (moderately hypercellular with maturing trilineage hematopoiesis)       c.  Peripheral blood:           - No circulating plasma cells           - Minimal normocytic anemia   2.  M-spike: 1.95 g/dl IgG lambda   3.  Chromosome and FISH (myeloma panel) studies pending; appended results to follow   4.  See comment   Electronically signed by Virginia Cummings MD on 8/21/2023 at 10:20 AM   Comment     Clinical correlation is required to evaluate for any evidence of myeloma-related end organ damage in order to distinguish between smoldering (asymptomatic) plasma cell myeloma and (symptomatic) plasma cell myeloma.      Chromosome Analysis:   Negative for a clonal chromosome abnormality.     Plasma Cell Dyscrasia FISH Panel:    Positive for a trisomy result with gains of chromosomes 5, 9, and 15 suggesting hyperdiploidy. Negative for remaining panel probes.     Patient Active Problem List   Diagnosis    AC separation, type 2    Diabetes mellitus without complication (H)    Gout    Hyperlipidemia    Hypertensive disorder    Injury of thoracic spinal cord (H)    Multiple myeloma (H)    Plasmacytoma (H)    Spinal cord mass (H)           ------------------------------------------------------------------------------------------------------------------------------------------------    Patient Care Team         Relationship Specialty Notifications Start End    James Meyer MD PCP - General   10/17/23     Phone: [phone] Fax: [phone]         [address] [place] [address]

## 2023-10-20 NOTE — NURSING NOTE
"Oncology Rooming Note    October 20, 2023 1:16 PM   Dutch Mckeon is a 73 year old male who presents for:    Chief Complaint   Patient presents with    Oncology Clinic Visit     Multiple Myeloma      Initial Vitals: /66 (BP Location: Right arm, Patient Position: Sitting, Cuff Size: Adult Regular)   Pulse 63   Temp 98  F (36.7  C) (Oral)   Resp 20   Ht 1.801 m (5' 10.91\")   Wt 92.1 kg (203 lb)   SpO2 98%   BMI 28.39 kg/m   Estimated body mass index is 28.39 kg/m  as calculated from the following:    Height as of this encounter: 1.801 m (5' 10.91\").    Weight as of this encounter: 92.1 kg (203 lb). Body surface area is 2.15 meters squared.  No Pain (0) Comment: Data Unavailable   No LMP for male patient.  Allergies reviewed: Yes  Medications reviewed: Yes    Medications: Medication refills not needed today.  Pharmacy name entered into [facility]: [facility]FA[facility] ONLY - [place]    Clinical concerns:  none      Susan Rodriguez"

## 2023-10-20 NOTE — PROGRESS NOTES
"Blood and Marrow Transplant   New Transplant Visit with   Clinical     Assessment completed on 10/20/2023 in the BMT clinic of living situation, support system, financial status, functional status, coping, stressors, need for resources and social work intervention provided as needed. Information for this assessment was provided by pt and pt's wife Payton's report, consultation with medical team, and medical chart review.      Present:  Patient: Dutch Mckeon \"Marshal\"  Spouse: Payton Laboy  : BENJI Mar, [facility]    Medical Team   Nurse Coordinator: Micaela Oliveira RN  BMT Physician: Jesus James MD  Referring Physician: Stephanie Romero MD  Long Term BMT SW: BENJI Albarado, [facility]    Diagnosis: Multiple Myeloma  Diagnosis Date: 2023    Presenting Information:  Pt is a 73 year old male diagnosed with MM. Pt was diagnosed on 2023. Pt presents for Autologous stem cell transplant discussion.    Contact Information:  Cell Phone: [phone]  Home Phone: [phone] (moving-# will change)  Pts Email: [email or web address]  Spouse Payton's Phone: [phone]    Special Needs:   Pt/Spouse are currently [address] at:  [address] [place] [address]    They are in the process of moving to a new apartment:  [id number]  [place] [address]    They report that they are still deciding what to do with their home. They shared that they will likely make a decision closer to [place].     Relocation Requirement:   Pt lives in [place], [place] [facility] the required distance of the hospital. Pt does not need to relocate.     Living Situation:   Pt lives in [place], [place] with spouse Payton.    Family Information:   Spouse: Payton Laboy  Parents:   Siblings: 2 brothers: Lucius-[place] and Rigoberto-[place]  Children: 1 Daughter Evelyne ([address] [place] [address])    Education/Employment:  Currently employed: No-Retired  Employer: [employer]  Occupation: " Manager    Spouse/Partner Employed: No  Occupation: Homemaker    Insurance:   No insurance concerns identified at this time. SW provided information regarding the insurance authorization process and the role of the BMT Financial . SW provided contact info for the BMT Financial  and referred pt to them for future insurance questions.     Finances:   Pt's source of income is Social Security [employer] and Investments. No financial concerns identified at this time. SW discussed geoff options and asked pt to let SW know if they would like to apply in the future.     Caregiver:   PEDRO discussed with the pt and pts spouse the caregiver role and expectation at length. Pt is agreeable to having a full time caregiver for the minimum of 30 days until cleared by the BMT Physician. Pt's identified caregivers are spouse Payton. Caregiver education and information provided. No caregiver concerns identified.     Healthcare Directive:  Yes. Pt has a health care directive and will bring it when they return to the BMT program.     Resources Provided:  -BMT Information Book  -BMT Resources Packet  -Healthcare Directive  -Honoring Choices - Your Rights: Making Your Own Health Care Treatment Decisions  -Caregiver Contract/Description  -Transplant Unit Description and Information   -Lodging Resources    Identified Concerns:  Spouse Payton will be the primary care-giver. However, they did express some concerns about not having much local support.  Spouse Payton shared that she is unused to being limited/restricted and that it will take some adjustment as they both navigate their usual activities of daily living. They shared that they do have some friends that they feel they would be able to contact to assist with urgent concerns.  They shared that they will continue to explore additional care-giving options.  They would appreciate an email with private duty nursing resources-emailed on 10/20/2023.    Summary:  Pt  presents to [facility] regarding an Autologous stem cell transplant. Pt and pt's spouse asked good/appropriate questions regarding psychosocial factors related to BMT; all questions were addressed. Pt presented as pleasant. Pt's affect was appropriate. Patient indicated they are currently feeling overwhelmed and will need some time to establish a plan when pt is post-transplant. Family's affect was appropriate.     Plan:   SW provided contact information and encouraged pt to contact SW with any additional questions, concerns, resources and/or for support. SW will continue to follow pt to provide support and guidance with resources as needed.     BENJI Mar, [facility]  Adult Blood & Marrow Transplant   Phone: [phone]  Pager: [phone]

## 2023-11-25 ENCOUNTER — HEALTH MAINTENANCE LETTER (OUTPATIENT)
Age: 73
End: 2023-11-25

## 2023-12-21 DIAGNOSIS — C90.01 MULTIPLE MYELOMA IN REMISSION (H): Primary | ICD-10-CM

## 2023-12-21 DIAGNOSIS — E55.9 VITAMIN D DEFICIENCY: ICD-10-CM

## 2023-12-21 DIAGNOSIS — Z86.2 PERSONAL HISTORY OF DISEASES OF BLOOD AND BLOOD-FORMING ORGANS: ICD-10-CM

## 2023-12-21 DIAGNOSIS — Z01.818 EXAMINATION PRIOR TO CHEMOTHERAPY: ICD-10-CM

## 2023-12-22 DIAGNOSIS — C90.00 MULTIPLE MYELOMA, REMISSION STATUS UNSPECIFIED (H): Primary | ICD-10-CM

## 2023-12-22 RX ORDER — HEPARIN SODIUM (PORCINE) LOCK FLUSH IV SOLN 100 UNIT/ML 100 UNIT/ML
5 SOLUTION INTRAVENOUS
OUTPATIENT
Start: 2024-01-09

## 2023-12-22 RX ORDER — HEPARIN SODIUM,PORCINE 10 UNIT/ML
5-20 VIAL (ML) INTRAVENOUS DAILY PRN
OUTPATIENT
Start: 2024-01-09

## 2023-12-22 RX ORDER — PENTAMIDINE ISETHIONATE 300 MG/300MG
300 INHALANT RESPIRATORY (INHALATION)
OUTPATIENT
Start: 2024-01-09 | End: 2024-01-09

## 2023-12-22 RX ORDER — ALBUTEROL SULFATE 0.83 MG/ML
2.5 SOLUTION RESPIRATORY (INHALATION)
OUTPATIENT
Start: 2024-01-09 | End: 2024-01-09

## 2023-12-26 ENCOUNTER — TELEPHONE (OUTPATIENT)
Dept: TRANSPLANT | Facility: CLINIC | Age: 73
End: 2023-12-26
Payer: MEDICARE

## 2023-12-26 DIAGNOSIS — C90.01 MULTIPLE MYELOMA IN REMISSION (H): ICD-10-CM

## 2023-12-26 DIAGNOSIS — C90.00 MULTIPLE MYELOMA, REMISSION STATUS UNSPECIFIED (H): Primary | ICD-10-CM

## 2023-12-29 ENCOUNTER — LAB (OUTPATIENT)
Dept: LAB | Facility: CLINIC | Age: 73
End: 2023-12-29
Payer: MEDICARE

## 2023-12-29 DIAGNOSIS — C90.00 PLASMA CELL MYELOMA (H): Primary | ICD-10-CM

## 2023-12-29 PROCEDURE — 88321 CONSLTJ&REPRT SLD PREP ELSWR: CPT | Performed by: STUDENT IN AN ORGANIZED HEALTH CARE EDUCATION/TRAINING PROGRAM

## 2024-01-02 ENCOUNTER — TELEPHONE (OUTPATIENT)
Dept: TRANSPLANT | Facility: CLINIC | Age: 74
End: 2024-01-02
Payer: MEDICARE

## 2024-01-02 NOTE — TELEPHONE ENCOUNTER
Returned call to patient regarding transplant timing and caregiver coordination. Left voice mail with messages that stated: Pre work up, we are unable to speculate when the transplant will occur, and will be dependent on apheresis timing. We will go into detail during the work up teach to discuss when we will know the dates so he can inform his caregivers.     Additionally, I informed him to continue taking his baby Asprin, no need to hold prior to his BMBX.     Left the BMT office number for the patient to call back if he has further questions.

## 2024-01-02 NOTE — TELEPHONE ENCOUNTER
----- Message from Zohra Gardner sent at 1/2/2024 10:41 AM CST -----  Regarding: Transplant  Marshal just called me and he is very concerned about the plan for transplant after his close on 1/15 because he's trying to coordinate out of town caregivers.    I told him this is not something that will be set until the close, but he is hoping someone can give him a call with some basic idea of what might happen.     Can someone please call him?    Thank you.    Zohra

## 2024-01-03 ENCOUNTER — MEDICAL CORRESPONDENCE (OUTPATIENT)
Dept: TRANSPLANT | Facility: CLINIC | Age: 74
End: 2024-01-03
Payer: MEDICARE

## 2024-01-08 LAB
ABO/RH(D): ABNORMAL
ANTIBODY SCREEN: POSITIVE
PATH REPORT.COMMENTS IMP SPEC: ABNORMAL
PATH REPORT.COMMENTS IMP SPEC: YES
PATH REPORT.FINAL DX SPEC: ABNORMAL
PATH REPORT.GROSS SPEC: ABNORMAL
PATH REPORT.MICROSCOPIC SPEC OTHER STN: ABNORMAL
PATH REPORT.RELEVANT HX SPEC: ABNORMAL
PATH REPORT.SITE OF ORIGIN SPEC: ABNORMAL
SPECIMEN EXPIRATION DATE: ABNORMAL

## 2024-01-09 ENCOUNTER — MEDICAL CORRESPONDENCE (OUTPATIENT)
Dept: TRANSPLANT | Facility: CLINIC | Age: 74
End: 2024-01-09
Payer: MEDICARE

## 2024-01-09 ENCOUNTER — OFFICE VISIT (OUTPATIENT)
Dept: TRANSPLANT | Facility: CLINIC | Age: 74
End: 2024-01-09
Attending: INTERNAL MEDICINE
Payer: MEDICARE

## 2024-01-09 ENCOUNTER — OFFICE VISIT (OUTPATIENT)
Dept: PULMONOLOGY | Facility: CLINIC | Age: 74
End: 2024-01-09
Payer: MEDICARE

## 2024-01-09 ENCOUNTER — LAB (OUTPATIENT)
Dept: LAB | Facility: CLINIC | Age: 74
End: 2024-01-09
Attending: INTERNAL MEDICINE
Payer: MEDICARE

## 2024-01-09 VITALS
TEMPERATURE: 97.8 F | DIASTOLIC BLOOD PRESSURE: 75 MMHG | WEIGHT: 196.9 LBS | OXYGEN SATURATION: 97 % | RESPIRATION RATE: 16 BRPM | HEART RATE: 79 BPM | BODY MASS INDEX: 27.53 KG/M2 | SYSTOLIC BLOOD PRESSURE: 156 MMHG

## 2024-01-09 DIAGNOSIS — E55.9 VITAMIN D DEFICIENCY: ICD-10-CM

## 2024-01-09 DIAGNOSIS — Z01.818 EXAMINATION PRIOR TO CHEMOTHERAPY: ICD-10-CM

## 2024-01-09 DIAGNOSIS — C90.01 MULTIPLE MYELOMA IN REMISSION (H): ICD-10-CM

## 2024-01-09 DIAGNOSIS — Z86.2 PERSONAL HISTORY OF DISEASES OF BLOOD AND BLOOD-FORMING ORGANS: ICD-10-CM

## 2024-01-09 LAB
ALBUMIN SERPL BCG-MCNC: 4.5 G/DL (ref 3.5–5.2)
ALBUMIN UR-MCNC: NEGATIVE MG/DL
ALP SERPL-CCNC: 95 U/L (ref 40–150)
ALT SERPL W P-5'-P-CCNC: 18 U/L (ref 0–70)
ANION GAP SERPL CALCULATED.3IONS-SCNC: 12 MMOL/L (ref 7–15)
ANTIBODY ID: NORMAL
ANTIBODY SCREEN, TUBE: NORMAL
APPEARANCE UR: CLEAR
APTT PPP: 33 SECONDS (ref 22–38)
AST SERPL W P-5'-P-CCNC: 27 U/L (ref 0–45)
BASOPHILS # BLD AUTO: 0.1 10E3/UL (ref 0–0.2)
BASOPHILS NFR BLD AUTO: 2 %
BILIRUB SERPL-MCNC: 0.6 MG/DL
BILIRUB UR QL STRIP: NEGATIVE
BUN SERPL-MCNC: 12.1 MG/DL (ref 8–23)
CALCIUM SERPL-MCNC: 9.8 MG/DL (ref 8.8–10.2)
CHLORIDE SERPL-SCNC: 103 MMOL/L (ref 98–107)
COLOR UR AUTO: ABNORMAL
CREAT SERPL-MCNC: 0.69 MG/DL (ref 0.67–1.17)
DEPRECATED HCO3 PLAS-SCNC: 25 MMOL/L (ref 22–29)
EBV VCA IGG SER IA-ACNC: 697 U/ML
EBV VCA IGG SER IA-ACNC: POSITIVE
EGFRCR SERPLBLD CKD-EPI 2021: >90 ML/MIN/1.73M2
EOSINOPHIL # BLD AUTO: 0.1 10E3/UL (ref 0–0.7)
EOSINOPHIL NFR BLD AUTO: 2 %
ERYTHROCYTE [DISTWIDTH] IN BLOOD BY AUTOMATED COUNT: 16.6 % (ref 10–15)
GLUCOSE SERPL-MCNC: 170 MG/DL (ref 70–99)
GLUCOSE UR STRIP-MCNC: 200 MG/DL
HCT VFR BLD AUTO: 39.7 % (ref 40–53)
HGB BLD-MCNC: 12.9 G/DL (ref 13.3–17.7)
HGB UR QL STRIP: NEGATIVE
HOLD SPECIMEN: NORMAL
HSV1 IGG SERPL QL IA: <0.01 INDEX
HSV1 IGG SERPL QL IA: NORMAL
HSV2 IGG SERPL QL IA: 0.07 INDEX
HSV2 IGG SERPL QL IA: NORMAL
IMM GRANULOCYTES # BLD: 0 10E3/UL
IMM GRANULOCYTES NFR BLD: 0 %
INR PPP: 1.1 (ref 0.85–1.15)
KETONES UR STRIP-MCNC: NEGATIVE MG/DL
LDH SERPL L TO P-CCNC: 178 U/L (ref 0–250)
LEUKOCYTE ESTERASE UR QL STRIP: NEGATIVE
LYMPHOCYTES # BLD AUTO: 0.9 10E3/UL (ref 0.8–5.3)
LYMPHOCYTES NFR BLD AUTO: 20 %
MAGNESIUM SERPL-MCNC: 1.9 MG/DL (ref 1.7–2.3)
MCH RBC QN AUTO: 28.6 PG (ref 26.5–33)
MCHC RBC AUTO-ENTMCNC: 32.5 G/DL (ref 31.5–36.5)
MCV RBC AUTO: 88 FL (ref 78–100)
MONOCYTES # BLD AUTO: 0.3 10E3/UL (ref 0–1.3)
MONOCYTES NFR BLD AUTO: 6 %
MUCOUS THREADS #/AREA URNS LPF: PRESENT /LPF
NEUTROPHILS # BLD AUTO: 3 10E3/UL (ref 1.6–8.3)
NEUTROPHILS NFR BLD AUTO: 70 %
NITRATE UR QL: NEGATIVE
NRBC # BLD AUTO: 0 10E3/UL
NRBC BLD AUTO-RTO: 0 /100
PH UR STRIP: 5.5 [PH] (ref 5–7)
PHOSPHATE SERPL-MCNC: 3.5 MG/DL (ref 2.5–4.5)
PLATELET # BLD AUTO: 316 10E3/UL (ref 150–450)
POTASSIUM SERPL-SCNC: 4.3 MMOL/L (ref 3.4–5.3)
PROT SERPL-MCNC: 7 G/DL (ref 6.4–8.3)
RBC # BLD AUTO: 4.51 10E6/UL (ref 4.4–5.9)
RBC URINE: 0 /HPF
SODIUM SERPL-SCNC: 140 MMOL/L (ref 135–145)
SP GR UR STRIP: 1.02 (ref 1–1.03)
SPECIMEN EXPIRATION DATE: NORMAL
SPECIMEN EXPIRATION DATE: NORMAL
SQUAMOUS EPITHELIAL: <1 /HPF
TOTAL PROTEIN SERUM FOR ELP: 6.3 G/DL (ref 6.4–8.3)
URATE SERPL-MCNC: 2.1 MG/DL (ref 3.4–7)
UROBILINOGEN UR STRIP-MCNC: NORMAL MG/DL
WBC # BLD AUTO: 4.3 10E3/UL (ref 4–11)
WBC URINE: 1 /HPF

## 2024-01-09 PROCEDURE — 84165 PROTEIN E-PHORESIS SERUM: CPT | Mod: 26 | Performed by: PATHOLOGY

## 2024-01-09 PROCEDURE — 86696 HERPES SIMPLEX TYPE 2 TEST: CPT

## 2024-01-09 PROCEDURE — 99214 OFFICE O/P EST MOD 30 MIN: CPT

## 2024-01-09 PROCEDURE — G0463 HOSPITAL OUTPT CLINIC VISIT: HCPCS | Mod: 25

## 2024-01-09 PROCEDURE — 82784 ASSAY IGA/IGD/IGG/IGM EACH: CPT

## 2024-01-09 PROCEDURE — 82232 ASSAY OF BETA-2 PROTEIN: CPT

## 2024-01-09 PROCEDURE — 94729 DIFFUSING CAPACITY: CPT | Performed by: INTERNAL MEDICINE

## 2024-01-09 PROCEDURE — 93005 ELECTROCARDIOGRAM TRACING: CPT

## 2024-01-09 PROCEDURE — 86870 RBC ANTIBODY IDENTIFICATION: CPT

## 2024-01-09 PROCEDURE — 86803 HEPATITIS C AB TEST: CPT

## 2024-01-09 PROCEDURE — 88240 CELL CRYOPRESERVE/STORAGE: CPT

## 2024-01-09 PROCEDURE — 83020 HEMOGLOBIN ELECTROPHORESIS: CPT

## 2024-01-09 PROCEDURE — 86900 BLOOD TYPING SEROLOGIC ABO: CPT

## 2024-01-09 PROCEDURE — 82785 ASSAY OF IGE: CPT

## 2024-01-09 PROCEDURE — 86777 TOXOPLASMA ANTIBODY: CPT

## 2024-01-09 PROCEDURE — 87516 HEPATITIS B DNA AMP PROBE: CPT

## 2024-01-09 PROCEDURE — 84100 ASSAY OF PHOSPHORUS: CPT

## 2024-01-09 PROCEDURE — 84550 ASSAY OF BLOOD/URIC ACID: CPT

## 2024-01-09 PROCEDURE — 80053 COMPREHEN METABOLIC PANEL: CPT

## 2024-01-09 PROCEDURE — 85025 COMPLETE CBC W/AUTO DIFF WBC: CPT

## 2024-01-09 PROCEDURE — G0463 HOSPITAL OUTPT CLINIC VISIT: HCPCS

## 2024-01-09 PROCEDURE — 83521 IG LIGHT CHAINS FREE EACH: CPT | Mod: 59

## 2024-01-09 PROCEDURE — 85730 THROMBOPLASTIN TIME PARTIAL: CPT

## 2024-01-09 PROCEDURE — 85610 PROTHROMBIN TIME: CPT

## 2024-01-09 PROCEDURE — 93010 ELECTROCARDIOGRAM REPORT: CPT | Performed by: INTERNAL MEDICINE

## 2024-01-09 PROCEDURE — 36415 COLL VENOUS BLD VENIPUNCTURE: CPT

## 2024-01-09 PROCEDURE — 0001U RBC DNA HEA 35 AG 11 BLD GRP: CPT

## 2024-01-09 PROCEDURE — 86334 IMMUNOFIX E-PHORESIS SERUM: CPT | Mod: 26 | Performed by: PATHOLOGY

## 2024-01-09 PROCEDURE — 84155 ASSAY OF PROTEIN SERUM: CPT

## 2024-01-09 PROCEDURE — 83615 LACTATE (LD) (LDH) ENZYME: CPT

## 2024-01-09 PROCEDURE — 82306 VITAMIN D 25 HYDROXY: CPT

## 2024-01-09 PROCEDURE — 94726 PLETHYSMOGRAPHY LUNG VOLUMES: CPT | Performed by: INTERNAL MEDICINE

## 2024-01-09 PROCEDURE — 86665 EPSTEIN-BARR CAPSID VCA: CPT

## 2024-01-09 PROCEDURE — 84165 PROTEIN E-PHORESIS SERUM: CPT | Mod: TC | Performed by: PATHOLOGY

## 2024-01-09 PROCEDURE — 86334 IMMUNOFIX E-PHORESIS SERUM: CPT | Performed by: PATHOLOGY

## 2024-01-09 PROCEDURE — 81001 URINALYSIS AUTO W/SCOPE: CPT

## 2024-01-09 PROCEDURE — 83735 ASSAY OF MAGNESIUM: CPT

## 2024-01-09 PROCEDURE — 94375 RESPIRATORY FLOW VOLUME LOOP: CPT | Performed by: INTERNAL MEDICINE

## 2024-01-09 RX ORDER — EZETIMIBE 10 MG/1
TABLET ORAL
COMMUNITY
Start: 2024-01-01

## 2024-01-09 RX ORDER — ALLOPURINOL 300 MG/1
TABLET ORAL
COMMUNITY
Start: 2023-12-26

## 2024-01-09 RX ORDER — ATORVASTATIN CALCIUM 80 MG/1
TABLET, FILM COATED ORAL
COMMUNITY
Start: 2023-11-11

## 2024-01-09 RX ORDER — BLOOD SUGAR DIAGNOSTIC
STRIP MISCELLANEOUS
COMMUNITY
Start: 2023-12-30

## 2024-01-09 RX ORDER — SENNOSIDES A AND B 8.6 MG/1
TABLET, FILM COATED ORAL
COMMUNITY
Start: 2023-08-25

## 2024-01-09 ASSESSMENT — PAIN SCALES - GENERAL: PAINLEVEL: NO PAIN (0)

## 2024-01-09 NOTE — PROGRESS NOTES
[facility]  BMTCT OPEN VISIT    January 9, 2024      Dutch Mckeon is a 73 year old male undergoing evaluation prior to hematopoietic cell transplant or immune effector cell therapy.    Reason for BMTCT: MM    Recent chemotherapy: 2 weeks ago    Recent infections: no    Blood thinner use? If yes, why? No    Treatment for diabetes? Yes; metformin; required insulin w/ dex    Today, the patient notes the following symptoms:  Review Of Systems  Skin: positive for melanoma R buttock s/p excision  Eyes: negative  Ears/Nose/Throat: negative  Respiratory: No shortness of breath, dyspnea on exertion, cough, or hemoptysis  Cardiovascular: negative  Gastrointestinal: negative  Genitourinary: negative  Musculoskeletal: presented w/ a spinal mass which has been radiated  Neurologic: numbness from the waist down, saddle numbness, b/l LE weakness with initial diagnosis of spinal mass; neuropathy in the feet remains  Psychiatric: negative  Hematologic/Lymphatic/Immunologic: positive for hx of MM  Endocrine: DM2; requires insulin w/ HD dex      Dutch Mckeon's History    Past Medical History:   Diagnosis Date    Basal cell cancer     nose    Gout     Hyperlipidemia     Kidney stone     Type 2 diabetes mellitus without complications (H)        Past Surgical History:   Procedure Laterality Date    ENT SURGERY      tonsillectomy    EXCISE LESION HEAD Left 11/13/2015    Procedure: EXCISE LESION HEAD;  Surgeon: Amadeo Liu MD;  Location: [facility]    EXCISE LESION LOWER EXTREMITY  3/21/2014    Procedure: EXCISE LESION LOWER EXTREMITY;  EXCISION LESION RIGHT FOOT FOURTH WEB SPACE;  Surgeon: Amadeo Liu MD;  Location: [facility]    SOFT TISSUE SURGERY      mohs to nose       No family history on file.    Social History     Tobacco Use    Smoking status: Never    Smokeless tobacco: Not on file   Substance Use Topics    Alcohol use: Yes       Dutch CASTAÑEDA Souravper's Medications and Allergies    Current Outpatient  Medications   Medication    ACCU-CHEK GUIDE test strip    acyclovir (ZOVIRAX) 400 MG tablet    allopurinol (ZYLOPRIM) 300 MG tablet    ALLOPURINOL PO    ASPIRIN EC PO    atorvastatin (LIPITOR) 80 MG tablet    ATORVASTATIN CALCIUM PO    Ezetimibe (ZETIA PO)    ezetimibe (ZETIA) 10 MG tablet    gabapentin (NEURONTIN) 100 MG capsule    METFORMIN HCL PO    multivitamin, therapeutic with minerals (MULTI-VITAMIN) TABS    senna (SENOKOT) 8.6 MG tablet    VITAMIN D, CHOLECALCIFEROL, PO    acetaminophen (TYLENOL) 500 MG tablet    bortezomib (VELCADE) 3.5 MG injection    dexAMETHasone (DECADRON) 4 MG tablet    diphenhydrAMINE (BENADRYL) 25 MG capsule    glipiZIDE (GLUCOTROL) 5 MG tablet    LENalidomide (REVLIMID) 25 MG CAPS capsule    pantoprazole (PROTONIX) 40 MG EC tablet    sulfamethoxazole-trimethoprim (BACTRIM/SEPTRA) 8 mg/mL suspension    UNABLE TO FIND     No current facility-administered medications for this visit.        No Known Allergies      Physical Examination    BP (!) 156/75   Pulse 79   Temp 97.8  F (36.6  C) (Oral)   Resp 16   Wt 89.3 kg (196 lb 14.4 oz)   SpO2 97%   BMI 27.53 kg/m      Exam:  Constitutional: healthy, alert, and no distress  Head: Normocephalic. No masses, lesions, tenderness or abnormalities  ENT: ENT exam normal, no neck nodes or sinus tenderness  Cardiovascular: RRR  Respiratory: CTA b/l  Gastrointestinal: Abdomen soft, non-tender. BS normal. No masses, organomegaly  Musculoskeletal: extremities normal- no gross deformities noted; walks w/ a walker secondary to numbness of b/l LE (secondary to spinal mass)  Skin: no rash  Neurologic: numbness/neuropathy to b/l LE--symmetric; walks slowly with aid of a walker secondary to hx of spinal mass  Psychiatric: mentation appears normal and affect normal/bright  Hematologic/Lymphatic/Immunologic: Normal cervical lymph nodes      Frailty Screening  BMT Fried Frailty          1/9/2024    13:39   Fried Frailty   Lost>10 pounds unintenionally  last year N   Exhaustion Score 0   Slowness Score 1   Weakness/ Strength Score 1   Low Activity Level Score 1   Final Score Frail   Final Score Number 3   Sit Stand Assessment   Patient able to perform 5 chair stands Y   Chair Stands in seconds 13   Patient is able to perform stand with Feet Side by Side? Y   First attempt (in seconds): 10   Patient is able to perform Semi-Tandem Stand? Y   First attemp (in seconds): 10   Patient is able to perform Tandem Stand? N         I have reviewed the diagnostic data, medications, frailty screening, and general processes prior to BMTCT.  I have notified the Primary BMT Physician/and or Attending Physician in the clinic of any issues. We also discussed in detail the database and biorepository research for which Dutch Mckeon is eligible. We discussed the potential risks and potential benefits of each of these protocols individually. We explained potential alternatives to the protocols discussed. We explained to the patient that participation is voluntary and that consent may be withdrawn at any time.       Consents Signed:   Blood transfusion consent form  Ethnicity form  [facility]  [facility] B[facility] Database    Present during the discussion was pt. Copies of the signed consent forms will be provided to the patient on admission. No procedures specific to any studies were performed prior to the patient signing the consent form.    Dutch Mckeon had the opportunity to ask questions, and I answered all of the questions to the best of my ability.      Brenda Martell PA-C

## 2024-01-09 NOTE — NURSING NOTE
"Oncology Rooming Note    January 9, 2024 1:24 PM   Dutch Mckeon is a 73 year old male who presents for:    Chief Complaint   Patient presents with    Blood Draw      blood draw by lab RN. Vitals taken and appointment arrived    Oncology Clinic Visit     Multiple Myeloma      Initial Vitals: BP (!) 156/75   Pulse 79   Temp 97.8  F (36.6  C) (Oral)   Resp 16   Wt 89.3 kg (196 lb 14.4 oz)   SpO2 97%   BMI 27.53 kg/m   Estimated body mass index is 27.53 kg/m  as calculated from the following:    Height as of 10/20/23: 1.801 m (5' 10.91\").    Weight as of this encounter: 89.3 kg (196 lb 14.4 oz). Body surface area is 2.11 meters squared.  No Pain (0) Comment: Data Unavailable   No LMP for male patient.  Allergies reviewed: Yes  Medications reviewed: Yes    Medications: Medication refills not needed today.  Pharmacy name entered into [employer]:    [facility] - [facility] #[id number] - [place], [place] - [address]    Frailty Screening:   Is the patient here for a new oncology consult visit in cancer care? 2. No      Clinical concerns: Are there prep drugs for prior to bone marrow biopsy he should be taking?       Susan Rodriguez              "

## 2024-01-09 NOTE — LETTER
1/9/2024         RE: Dutch Mckeon  [address] [place] [address]        Dear Colleague,    Thank you for referring your patient, Dutch Mckeon, to the [facility] BLOOD AND MARROW TRANSPLANT PROGRAM [place]. Please see a copy of my visit note below.    [facility]  BMTCT OPEN VISIT    January 9, 2024      Dutch Mckeon is a 73 year old male undergoing evaluation prior to hematopoietic cell transplant or immune effector cell therapy.    Reason for BMTCT: MM    Recent chemotherapy: 2 weeks ago    Recent infections: no    Blood thinner use? If yes, why? No    Treatment for diabetes? Yes; metformin; required insulin w/ dex    Today, the patient notes the following symptoms:  Review Of Systems  Skin: positive for melanoma R buttock s/p excision  Eyes: negative  Ears/Nose/Throat: negative  Respiratory: No shortness of breath, dyspnea on exertion, cough, or hemoptysis  Cardiovascular: negative  Gastrointestinal: negative  Genitourinary: negative  Musculoskeletal: presented w/ a spinal mass which has been radiated  Neurologic: numbness from the waist down, saddle numbness, b/l LE weakness with initial diagnosis of spinal mass; neuropathy in the feet remains  Psychiatric: negative  Hematologic/Lymphatic/Immunologic: positive for hx of MM  Endocrine: DM2; requires insulin w/ HD dex      Dutch Mckeon's History    Past Medical History:   Diagnosis Date    Basal cell cancer     nose    Gout     Hyperlipidemia     Kidney stone     Type 2 diabetes mellitus without complications (H)        Past Surgical History:   Procedure Laterality Date    ENT SURGERY      tonsillectomy    EXCISE LESION HEAD Left 11/13/2015    Procedure: EXCISE LESION HEAD;  Surgeon: Amadeo Liu MD;  Location: [facility]    EXCISE LESION LOWER EXTREMITY  3/21/2014    Procedure: EXCISE LESION LOWER EXTREMITY;  EXCISION LESION RIGHT FOOT FOURTH WEB SPACE;  Surgeon: Amadeo Liu MD;  Location:   [facility]    SOFT TISSUE SURGERY      mohs to nose       No family history on file.    Social History     Tobacco Use    Smoking status: Never    Smokeless tobacco: Not on file   Substance Use Topics    Alcohol use: Yes       Dutch CASTAÑEDA Hepper's Medications and Allergies    Current Outpatient Medications   Medication    ACCU-CHEK GUIDE test strip    acyclovir (ZOVIRAX) 400 MG tablet    allopurinol (ZYLOPRIM) 300 MG tablet    ALLOPURINOL PO    ASPIRIN EC PO    atorvastatin (LIPITOR) 80 MG tablet    ATORVASTATIN CALCIUM PO    Ezetimibe (ZETIA PO)    ezetimibe (ZETIA) 10 MG tablet    gabapentin (NEURONTIN) 100 MG capsule    METFORMIN HCL PO    multivitamin, therapeutic with minerals (MULTI-VITAMIN) TABS    senna (SENOKOT) 8.6 MG tablet    VITAMIN D, CHOLECALCIFEROL, PO    acetaminophen (TYLENOL) 500 MG tablet    bortezomib (VELCADE) 3.5 MG injection    dexAMETHasone (DECADRON) 4 MG tablet    diphenhydrAMINE (BENADRYL) 25 MG capsule    glipiZIDE (GLUCOTROL) 5 MG tablet    LENalidomide (REVLIMID) 25 MG CAPS capsule    pantoprazole (PROTONIX) 40 MG EC tablet    sulfamethoxazole-trimethoprim (BACTRIM/SEPTRA) 8 mg/mL suspension    UNABLE TO FIND     No current facility-administered medications for this visit.        No Known Allergies      Physical Examination    BP (!) 156/75   Pulse 79   Temp 97.8  F (36.6  C) (Oral)   Resp 16   Wt 89.3 kg (196 lb 14.4 oz)   SpO2 97%   BMI 27.53 kg/m      Exam:  Constitutional: healthy, alert, and no distress  Head: Normocephalic. No masses, lesions, tenderness or abnormalities  ENT: ENT exam normal, no neck nodes or sinus tenderness  Cardiovascular: RRR  Respiratory: CTA b/l  Gastrointestinal: Abdomen soft, non-tender. BS normal. No masses, organomegaly  Musculoskeletal: extremities normal- no gross deformities noted; walks w/ a walker secondary to numbness of b/l LE (secondary to spinal mass)  Skin: no rash  Neurologic: numbness/neuropathy to b/l LE--symmetric; walks slowly with aid of a  walker secondary to hx of spinal mass  Psychiatric: mentation appears normal and affect normal/bright  Hematologic/Lymphatic/Immunologic: Normal cervical lymph nodes      Frailty Screening  BMT Fried Frailty          1/9/2024    13:39   Fried Frailty   Lost>10 pounds unintenionally last year N   Exhaustion Score 0   Slowness Score 1   Weakness/ Strength Score 1   Low Activity Level Score 1   Final Score Frail   Final Score Number 3   Sit Stand Assessment   Patient able to perform 5 chair stands Y   Chair Stands in seconds 13   Patient is able to perform stand with Feet Side by Side? Y   First attempt (in seconds): 10   Patient is able to perform Semi-Tandem Stand? Y   First attemp (in seconds): 10   Patient is able to perform Tandem Stand? N         I have reviewed the diagnostic data, medications, frailty screening, and general processes prior to BMTCT.  I have notified the Primary BMT Physician/and or Attending Physician in the clinic of any issues. We also discussed in detail the database and biorepository research for which Dutch Mckeon is eligible. We discussed the potential risks and potential benefits of each of these protocols individually. We explained potential alternatives to the protocols discussed. We explained to the patient that participation is voluntary and that consent may be withdrawn at any time.       Consents Signed:   Blood transfusion consent form  Ethnicity form  [facility] database  [facility] BMTCT Database    Present during the discussion was pt. Copies of the signed consent forms will be provided to the patient on admission. No procedures specific to any studies were performed prior to the patient signing the consent form.    Dutch Mckeon had the opportunity to ask questions, and I answered all of the questions to the best of my ability.    Brenda Martell PA-C

## 2024-01-09 NOTE — LETTER
1/9/2024         RE: Dutch Mckeon  [address] [place] [address]        Dear Colleague,    Thank you for referring your patient, Dutch Mckeon, to the [facility] BLOOD AND MARROW TRANSPLANT PRO[facility]. Please see a copy of my visit note below.    BMT Teaching Flowsheet    Dutch Mckeon is a 73 year old male    Blood and Marrow Transplant - Workup Calendar Review    Dutch Mckeon is a 73 year old male  Diagnoses of Multiple myeloma in remission (H), Examination prior to chemotherapy, Personal history of diseases of blood and blood-forming organs, and Vitamin D deficiency were pertinent to this visit.    Teaching Topic: work up routine  Person(s) involved: Patient and Spouse    Patient demonstrates understanding of the following:  - Reason for the appointment, diagnosis and treatment plan: Yes    Reviewed calendar and locations of procedures. Patient understands to check in for appointments at the  and to contact the BMT Office [phone] if there are schedule questions.    24 hr urine collection needed? Yes   If yes: Patient instructed to  collection container in lab. Patient will drop off container on 1/11 and will have corresponding lab appointment scheduled on 1/11.    Instructional Materials Used/Given: copy of calendar, map of campus    Specific Concerns: Yes: Pt has a guest unit available in Feb in their building. Pt would like to time the transplant with the availability of that accomodation.     Time spent with patient: 30 minutes     Diagnoses of Multiple myeloma in remission (H), Examination prior to chemotherapy, Personal history of diseases of blood and blood-forming organs, and Vitamin D deficiency were pertinent to this visit.    Teaching Topic: 2016-35 Auto    Person(s) involved in teaching: Patient and Spouse    Motivation Level  Asks Questions: Yes  Eager to Learn: Yes  Cooperative: Yes  Receptive (willing/able to accept information):  "Yes  Any cultural factors/Ep[employer] beliefs that may influence understanding or compliance? No    Patient and Caregiver demonstrates understanding of the following:   Reason for the appointment, diagnosis and treatment plan: Yes  Knowledge of proper use of medications and conditions for which they are ordered (with special attention to potential side effects or drug interactions): Yes  Which situations necessitate calling provider and whom to contact: Yes  Proper use and care of (medical equipment, care aids, etc.) Yes  Pain management techniques: Yes  How and/when to access community resources: Yes    Patient was provided with handouts for caring for their central venous catheter (proper hand hygiene, changing end caps, flushing line, changing CVC dressing). Yes    Patient was encouraged to view step-by-step instructional videos for central venous catheter care and report any questions or concerns. Yes     Infection Control:  Patient and Caregiver instructed on hand hygiene: Yes  Signs and symptoms of infection taught: Yes    Instructional Materials Used/Given:   Teaching/ learning concerns addressed: Patient was given and reviewed BMT Auto Transplant Teaching Binder, including: medication pamphlets, sample treatment calendars, consents, contact information for \"When to Call for Help\" (including after-hours contact), post-transplant precautions and guidelines.  Patient was encouraged to call with any additional questions.  .    Time spent with patient: 60 minutes.  Specific Concerns: Yes    Again, thank you for allowing me to participate in the care of your patient.        Sincerely,        Ismael Easley RN  "

## 2024-01-09 NOTE — NURSING NOTE
EKG was performed today per order written by Dr. Jesus James. Name and  verified with patient. Patient tolerated well without incident. File transmitted to chart.    Susan Rodriguez on 2024 at 1:55 PM

## 2024-01-09 NOTE — NURSING NOTE
[facility] LORIE Frailty assessment completed with patient in clinic. Patient had no questions and showed understanding of what assessment was being done.    Susan Rodriguez on 1/9/2024 at 1:55 PM

## 2024-01-09 NOTE — NURSING NOTE
Chief Complaint   Patient presents with    Blood Draw      blood draw by lab RN. Vitals taken and appointment arrived     Rylie Aleman RN

## 2024-01-10 ENCOUNTER — APPOINTMENT (OUTPATIENT)
Dept: LAB | Facility: CLINIC | Age: 74
End: 2024-01-10
Attending: INTERNAL MEDICINE
Payer: MEDICARE

## 2024-01-10 ENCOUNTER — ALLIED HEALTH/NURSE VISIT (OUTPATIENT)
Dept: TRANSPLANT | Facility: CLINIC | Age: 74
End: 2024-01-10
Attending: INTERNAL MEDICINE
Payer: MEDICARE

## 2024-01-10 VITALS
DIASTOLIC BLOOD PRESSURE: 80 MMHG | WEIGHT: 192.4 LBS | RESPIRATION RATE: 18 BRPM | OXYGEN SATURATION: 95 % | BODY MASS INDEX: 26.91 KG/M2 | SYSTOLIC BLOOD PRESSURE: 142 MMHG | HEART RATE: 72 BPM | TEMPERATURE: 98.1 F

## 2024-01-10 DIAGNOSIS — E55.9 VITAMIN D DEFICIENCY: ICD-10-CM

## 2024-01-10 DIAGNOSIS — Z86.2 PERSONAL HISTORY OF DISEASES OF BLOOD AND BLOOD-FORMING ORGANS: ICD-10-CM

## 2024-01-10 DIAGNOSIS — C90.01 MULTIPLE MYELOMA IN REMISSION (H): ICD-10-CM

## 2024-01-10 DIAGNOSIS — Z01.818 EXAMINATION PRIOR TO CHEMOTHERAPY: ICD-10-CM

## 2024-01-10 DIAGNOSIS — C90.00 MULTIPLE MYELOMA, REMISSION STATUS UNSPECIFIED (H): ICD-10-CM

## 2024-01-10 DIAGNOSIS — Z71.9 VISIT FOR COUNSELING: Primary | ICD-10-CM

## 2024-01-10 LAB
ATRIAL RATE - MUSE: 85 BPM
B2 MICROGLOB TUMOR MARKER SER-MCNC: 2.1 MG/L
BASOPHILS # BLD AUTO: 0.1 10E3/UL (ref 0–0.2)
BASOPHILS NFR BLD AUTO: 2 %
DIASTOLIC BLOOD PRESSURE - MUSE: NORMAL MMHG
EOSINOPHIL # BLD AUTO: 0.1 10E3/UL (ref 0–0.7)
EOSINOPHIL NFR BLD AUTO: 2 %
ERYTHROCYTE [DISTWIDTH] IN BLOOD BY AUTOMATED COUNT: 16.3 % (ref 10–15)
HCT VFR BLD AUTO: 37.9 % (ref 40–53)
HGB BLD-MCNC: 12.4 G/DL (ref 13.3–17.7)
IGA SERPL-MCNC: 26 MG/DL (ref 84–499)
IGE SERPL-ACNC: 8 KU/L (ref 0–114)
IGG SERPL-MCNC: 344 MG/DL (ref 610–1616)
IGM SERPL-MCNC: 25 MG/DL (ref 35–242)
IMM GRANULOCYTES # BLD: 0 10E3/UL
IMM GRANULOCYTES NFR BLD: 1 %
INTERPRETATION ECG - MUSE: NORMAL
KAPPA LC FREE SER-MCNC: 0.65 MG/DL (ref 0.33–1.94)
KAPPA LC FREE/LAMBDA FREE SER NEPH: 1.76 {RATIO} (ref 0.26–1.65)
LAMBDA LC FREE SERPL-MCNC: 0.37 MG/DL (ref 0.57–2.63)
LOCATION OF TASK: NORMAL
LYMPHOCYTES # BLD AUTO: 1.1 10E3/UL (ref 0.8–5.3)
LYMPHOCYTES NFR BLD AUTO: 22 %
MCH RBC QN AUTO: 28.7 PG (ref 26.5–33)
MCHC RBC AUTO-ENTMCNC: 32.7 G/DL (ref 31.5–36.5)
MCV RBC AUTO: 88 FL (ref 78–100)
MONOCYTES # BLD AUTO: 0.4 10E3/UL (ref 0–1.3)
MONOCYTES NFR BLD AUTO: 8 %
NEUTROPHILS # BLD AUTO: 3.2 10E3/UL (ref 1.6–8.3)
NEUTROPHILS NFR BLD AUTO: 65 %
NRBC # BLD AUTO: 0 10E3/UL
NRBC BLD AUTO-RTO: 0 /100
P AXIS - MUSE: 24 DEGREES
PLATELET # BLD AUTO: 304 10E3/UL (ref 150–450)
PR INTERVAL - MUSE: 164 MS
PROT PATTERN SERPL IFE-IMP: NORMAL
QRS DURATION - MUSE: 88 MS
QT - MUSE: 392 MS
QTC - MUSE: 466 MS
R AXIS - MUSE: 26 DEGREES
RBC # BLD AUTO: 4.32 10E6/UL (ref 4.4–5.9)
SYSTOLIC BLOOD PRESSURE - MUSE: NORMAL MMHG
T AXIS - MUSE: 47 DEGREES
VENTRICULAR RATE- MUSE: 85 BPM
WBC # BLD AUTO: 4.8 10E3/UL (ref 4–11)

## 2024-01-10 PROCEDURE — 88237 TISSUE CULTURE BONE MARROW: CPT

## 2024-01-10 PROCEDURE — 88311 DECALCIFY TISSUE: CPT | Mod: 26 | Performed by: PATHOLOGY

## 2024-01-10 PROCEDURE — 85097 BONE MARROW INTERPRETATION: CPT | Mod: GC | Performed by: PATHOLOGY

## 2024-01-10 PROCEDURE — 88275 CYTOGENETICS 100-300: CPT

## 2024-01-10 PROCEDURE — 250N000011 HC RX IP 250 OP 636: Performed by: STUDENT IN AN ORGANIZED HEALTH CARE EDUCATION/TRAINING PROGRAM

## 2024-01-10 PROCEDURE — 88271 CYTOGENETICS DNA PROBE: CPT

## 2024-01-10 PROCEDURE — 36415 COLL VENOUS BLD VENIPUNCTURE: CPT

## 2024-01-10 PROCEDURE — 38221 DX BONE MARROW BIOPSIES: CPT | Performed by: STUDENT IN AN ORGANIZED HEALTH CARE EDUCATION/TRAINING PROGRAM

## 2024-01-10 PROCEDURE — 88185 FLOWCYTOMETRY/TC ADD-ON: CPT

## 2024-01-10 PROCEDURE — 38222 DX BONE MARROW BX & ASPIR: CPT | Performed by: STUDENT IN AN ORGANIZED HEALTH CARE EDUCATION/TRAINING PROGRAM

## 2024-01-10 PROCEDURE — 85025 COMPLETE CBC W/AUTO DIFF WBC: CPT

## 2024-01-10 PROCEDURE — 88313 SPECIAL STAINS GROUP 2: CPT | Mod: 26 | Performed by: PATHOLOGY

## 2024-01-10 PROCEDURE — 88342 IMHCHEM/IMCYTCHM 1ST ANTB: CPT | Mod: TC

## 2024-01-10 PROCEDURE — 38222 DX BONE MARROW BX & ASPIR: CPT | Mod: RT | Performed by: STUDENT IN AN ORGANIZED HEALTH CARE EDUCATION/TRAINING PROGRAM

## 2024-01-10 PROCEDURE — 88184 FLOWCYTOMETRY/ TC 1 MARKER: CPT

## 2024-01-10 PROCEDURE — 88341 IMHCHEM/IMCYTCHM EA ADD ANTB: CPT | Mod: 26 | Performed by: PATHOLOGY

## 2024-01-10 PROCEDURE — 88342 IMHCHEM/IMCYTCHM 1ST ANTB: CPT | Mod: 26 | Performed by: PATHOLOGY

## 2024-01-10 PROCEDURE — 88291 CYTO/MOLECULAR REPORT: CPT | Performed by: MEDICAL GENETICS

## 2024-01-10 PROCEDURE — 88188 FLOWCYTOMETRY/READ 9-15: CPT | Mod: GC | Performed by: PATHOLOGY

## 2024-01-10 PROCEDURE — 88305 TISSUE EXAM BY PATHOLOGIST: CPT | Mod: 26 | Performed by: PATHOLOGY

## 2024-01-10 PROCEDURE — 88368 INSITU HYBRIDIZATION MANUAL: CPT | Mod: 26 | Performed by: MEDICAL GENETICS

## 2024-01-10 PROCEDURE — 88311 DECALCIFY TISSUE: CPT | Mod: TC

## 2024-01-10 RX ADMIN — MIDAZOLAM HYDROCHLORIDE 1 MG: 1 INJECTION, SOLUTION INTRAMUSCULAR; INTRAVENOUS at 11:36

## 2024-01-10 ASSESSMENT — ANXIETY QUESTIONNAIRES
GAD7 TOTAL SCORE: 0
2. NOT BEING ABLE TO STOP OR CONTROL WORRYING: NOT AT ALL
3. WORRYING TOO MUCH ABOUT DIFFERENT THINGS: NOT AT ALL
6. BECOMING EASILY ANNOYED OR IRRITABLE: NOT AT ALL
1. FEELING NERVOUS, ANXIOUS, OR ON EDGE: NOT AT ALL
GAD7 TOTAL SCORE: 0
7. FEELING AFRAID AS IF SOMETHING AWFUL MIGHT HAPPEN: NOT AT ALL
5. BEING SO RESTLESS THAT IT IS HARD TO SIT STILL: NOT AT ALL

## 2024-01-10 ASSESSMENT — PATIENT HEALTH QUESTIONNAIRE - PHQ9
SUM OF ALL RESPONSES TO PHQ QUESTIONS 1-9: 1
5. POOR APPETITE OR OVEREATING: NOT AT ALL

## 2024-01-10 ASSESSMENT — PAIN SCALES - GENERAL: PAINLEVEL: NO PAIN (0)

## 2024-01-10 NOTE — LETTER
"    1/10/2024         RE: Dutch Mckeon  [address] [place] [address]        Dear Colleague,    Thank you for referring your patient, Dutch Mckeon, to the [facility] BLOOD AND MARROW TRANSPLANT PROGRAM [place]. Please see a copy of my visit note below.    Pharmacy Assessment - Pre-Stem Cell Transplant    Assessments & Recommendations:  1) Consider endocrine consult to help manage the oral hypoglycemic medications during the peritransplant period.   2) Consider holding the glipizide until engraftment  3) Consider holding the metformin during the gi toxicity period.   4) Hold ASA and lipitor during the peritransplant period     If this patient is admitted under observation, the patient may bring in their own supply of the following medication for use in the hospital:  1) none  -Per \"Medications Not Supplied by Pharmacy\" policy (available on [employer])    History of Present Illness:  Dutch Mckeon is a 73 year old year old male diagnosed with multiple myeloma.  he has been treated with D-RVd.  he is now being work up for autologous Stem Cell Transplant on protocol GS2048-19, which utilizes Melphalan as a conditioning regimen.    Pertinent labs/tests:  Viral Testing:  CMV(NA) / HSV(-/-) / EBV(+) / VZV (?)  Ejection Fraction: _____% (future date)  QTc: 466msec (1/9)    Weights:   Wt Readings from Last 3 Encounters:   01/09/24 89.3 kg (196 lb 14.4 oz)   10/20/23 92.1 kg (203 lb)   11/13/15 104.6 kg (230 lb 11.2 oz)   Ideal body weight: 75.1 kg (165 lb 8.5 oz)  Adjusted ideal body weight: 80.8 kg (178 lb 1.2 oz)  % IBW:  119  There is no height or weight on file to calculate BMI.    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  A delay in compatible RBCs may ocur       Current Medications (pre-admit):  Current Outpatient Medications   Medication Sig Dispense Refill    ACCU-CHEK GUIDE test strip TEST ONE STRIP THREE TIMES DAILY AS DIRECTED      acetaminophen (TYLENOL) 500 MG tablet Take 1000 mg by mouth 1-3 hours before first three doses of daratumumab chemotherapy on days 1, 8, and 15 of cycle 1. Max acetaminophen dose: 4000 mg in 24 hrs.      acyclovir (ZOVIRAX) 400 MG tablet Take 1 tablet by mouth 2 times daily      allopurinol (ZYLOPRIM) 300 MG tablet       ALLOPURINOL PO Take 50 mg by mouth daily      ASPIRIN EC PO Take 81 mg by mouth daily      atorvastatin (LIPITOR) 80 MG tablet       ATORVASTATIN CALCIUM PO Take 1 tablet by mouth daily      bortezomib (VELCADE) 3.5 MG injection Inject into the vein once      dexAMETHasone (DECADRON) 4 MG tablet Take 20 mg by mouth once daily on days 1, 8 and 15 of each 21 day cycle. On cycle 1, take 1-3 hours before first three doses of daratumumab chemotherapy.      diphenhydrAMINE (BENADRYL) 25 MG capsule       Ezetimibe (ZETIA PO) Take 1 tablet by mouth daily      ezetimibe (ZETIA) 10 MG tablet       gabapentin (NEURONTIN) 100 MG capsule Take 100 mg by mouth      glipiZIDE (GLUCOTROL) 5 MG tablet  (Patient not taking: Reported on 1/9/2024)      LENalidomide (REVLIMID) 25 MG CAPS capsule       METFORMIN HCL PO Take 1,000 mg by mouth 2 times daily (with meals)       multivitamin, therapeutic with minerals (MULTI-VITAMIN) TABS Take 1 tablet by mouth daily      pantoprazole (PROTONIX) 40 MG EC tablet       senna (SENOKOT) 8.6 MG tablet Take 2 tablets at bedtime, but you can take up to 4 tablets if you need it      sulfamethoxazole-trimethoprim (BACTRIM/SEPTRA) 8 mg/mL suspension  (Patient not taking: Reported on  1/9/2024)      UNABLE TO FIND MEDICATION NAME: daratumumab      VITAMIN D, CHOLECALCIFEROL, PO Take 1,000 Units by mouth daily         Herbal Medication/Nutritional Supplements:  No issues     Smoking/Past Drug Use:  No issues     Nausea/Vomiting, Pain, or other issues:  No issues     Summary:  I met with Dutch Mckeon for approximately 45 minutes.  We discussed his current and transplant medications including the priming options (filgrastim, mozobil) conditioning chemotherapy (melphalan), antiemetics ( ondansetron, dexamethasone, palonosetron, prochlorperazine) , prophylactic antibiotics (levofloxacin, acyclovir, fluconazole, bactrim) and miscellaneous medications ( allopurinol, pantoprazole, filgrastim, claritin and vaccinations) .         BMT Pharm D, RP

## 2024-01-10 NOTE — PROGRESS NOTES
"CLINICAL SOCIAL WORK   PSYCHOSOCIAL ASSESSMENT  BLOOD AND MARROW TRANSPLANT SERVICE      Assessment completed on January 10, 2024  of living situation, support system, financial status, functional status, coping, stressors, need for resources and social work intervention provided as needed.  Information for this assessment was provided by Pt and spouse report in addition to medical chart review and consultation with medical team.     Present at assessment: Patient, Dutch \"Marshal\" Mike  and Payton Laboy were present for this assessment conducted by Emperatriz Golden, BMT .     Diagnosis: Multiple Myeloma (MM)    Date of Diagnosis: 8/2023    Transplant type: OP Autologous    Donor: Autologous     Physician: Jesus James MD    Nurse Coordinator: Ismael Easley RN    Work-up Nurse Coordinator: Edita Roberson RN    : Emperatriz Golden [facility], [facility]     Permanent Address:   [facility] [address] [place] [id number]    Pt confirmed this will be their address until April. They still have their home in [place] as well ([address]), but would like all mail sent to their apt during the BMT process.     Contact Information:  Pt Cell Phone: [phone]  Pt Email: [email or web address]  Pt's wife Payton Phone: [phone]    Presenting Information:  Marshal is a 73 year old male diagnosed with MM who presents for evaluation for autologous transplant at the [facility] ([facility]  Pt was accompanied to today's visit by his wife Payton.     Decision Making:   Self     Health Care Directive:   Will bring in copy Marshal has a completed HCD and will bring in a copy for scanning    Relationship Status:    to his wife Payton. Marshal shared they have been  for 50 years. Both identified their relationship as stable and supportive.    Special Lodging Needs: None identified at this time. Marshal moved into an apt in [place] and does not need to relocate. "     Family/Support System: Pt endorsed a good support system, but limited support in [place]. Support system including family and close friends who will be available to support Pt throughout transplant process.     Spouse: Payton Laboy    Children: Evelyne Aldana    Grandchildren: n/a    Parents:     Siblings: 2 brothers Lucius and Rigoberto    Friends: some close friends in the area    Caregiver: SW discussed with pt the caregiver role and expectation at length. Pt is agreeable to having a full time caregiver for a minimum of 30 days until cleared by the BMT physician. Pt's identified caregivers are his wife Payton. Pt signed the caregiver contract which will be scanned into the EMR. Caregiver education and resources provided. No caregiver concerns identified. Pt and Pt's family confirmed understanding caregiving requirement, including driving restrictions, as discussed during psychosocial assessment.     Name & Numbers  Payton Laboy [phone]    Transportation Mode:  Private Car . Pt is aware of driving restrictions post-BMT and the need for the caregiver is to drive until cleared to drive by the  BMT physician. SW provided information on parking info and monthly parking pass options. Pt will utilize the [employer] for transportation to and from the [facility] and BMT Clinic/Hospital.    Insurance:  No Insurance issues identified.  Pt denied specific insurance concerns at this time. SW reiterated information about the BMT Financial  should specific insurance questions arise as Pt moves through transplant process.     Sources of Income:  No income concerns identified  Marshal is supported by prison and savings . Pt denied anticipation of financial hardship related to BMT at this time.  SW encouraged Pt to contact this SW for additional potential resources should financial situation change.     Employment:   Employer: [employer]- Retired  Position: Manager     Spouse's  "Employment:  Employer:  Homemaker    Mental Health: No mental health issues identified       PHQ-9 assessment, score was 1 ,which indicates no current signs of depression.  GAD7 assessment, score was 0, which indicates no current signs of anxiety.    Marshal shared he has no history of anxiety or depress and noted he is not an very anxious person. He discussed that he does worry about his wife more who does express that she is worried about taking on the caregiving role. Marshal shared feeling a bit nervous, but overall ready to start the BMT process.     We talked about how some patients may see an increase in feelings of anxiety or depression while hospitalized for extended periods along with isolation. Encouraged Marshal and Payton to let us know if they are noticing an increase in symptoms. We talked about the variety of modalities available to use as coping mechanisms (including but not limited to guided imagery, relaxation techniques, progressive muscle relaxation, counseling/talk therapy and medication).    Chemical Use: No issues identified.  Marshal denies the use of tobacco, alcohol or marijuana. Marshal discussed he has only had 1 drink in the last 4 month.   Based on the information provided, there appear to be no specific risks or concerns identified at this time.     Trauma/Loss/Abuse History: Multiple losses associated with cancer diagnosis and treatment, including health, employment, changes to physical appearance, etc.     Spirituality:  Patient identifies with [place] community. Marshal shared he is Restorationism and will alert staff if he is interested in a blessing ceremony.     Coping: Pt noted that he is currently feeling \"positive, hopeful, nervous, and ready to begin\".  Pt shared that his main coping mechanisms are talking with friends and family and exercise.  Pt noted that he also paula by positive self-talk and meditation/guided imagery. SW and Pt discussed additional positive coping mechanisms that Pt can utilize " "while in the hospital.     Caregiver Coping: Pt's wife Payton noted that she is feeling \"fearful, hopeful, worried, nervous and apprehensive\" at this time.  Payton noted that she paula by talking with friends and family, reading books, exercise and taking naps. Payton did share she is considering talking to a mental health provider.     Payton discussed that she is very nervous about taking on the caregiver role for the pt. She feels concerned that she will do something wrong that could impact the pt as well as the impact this will have on her day to day return. We processed Payton's feelings and discussed options including seeing a therapist or attending support groups as a way to connect to other's to process her feelings. Therapist list provided to Payton.     Education Provided: Transplant process expectations, Caregiver requirements, Caregiver self-care, Financial issues related to transplant, Financial resources/grants available, Common psychosocial stressors pre/post transplant, Support group(s) available, Tour/layout of the inpatient unit/non-use of cell phones, Hospital resources available, Web site information, Resources for transplant patients and their families as well as the Clinical Social Work role.     Interventions Provided: Supportive counseling and education     Recreation/Leisure Activities:  Marshal shared that he enjoys golfing, bird watching and traveling    Plans for Recovery Leisure:  During his recovery he plans to read and watch TV.    Assessment and Recommendations for Team:  Pt is a 73 year old male diagnosed with MM who is here undergoing preparation for a planned autologous transplant.     Pt is a pleasant and articulate male who feels comfortable communicating with the medical team. Pt is pleasant, calm and able to articulate concerns/coping mechanisms in an appropriate manner. Marshal was alert, interactive, and affect was full, they displayed appropriate eye contact, memory and thought processes. Pt " has a strong supportive network of family and friends who are involved.     Pt will benefit from ongoing psychosocial support in regards to coping with the adjustment to the BMT process. CSW has discussed  psychosocial support options in regards to coping with the adjustment to the BMT process and support groups opportunities.      Pt has a good support system and a good caregiver plan. Pt verbalizes understanding of the transplant process and wanting to proceed. SW provided contact information and encouraged Pt to contact SW with questions, concerns, resources and for support. Per this assessment, I did not identify any barriers to this patient moving forward with transplant.        Important Information:   - Marshal will bring in his HCD for scanning  - Payton would like the after visit summary printed after each visit.     Follow up Planned:   Psychosocial support  Support group information  Spiritual Health referral      BENJI Albarado, [facility]  [facility]  Pager: [phone]  Phone: [phone]

## 2024-01-10 NOTE — PROGRESS NOTES
BMT ONC Adult Bone Marrow Biopsy Procedure Note  January 10, 2024  BP (!) 142/80   Pulse 72   Temp 98.1  F (36.7  C) (Oral)   Resp 18   Wt 87.3 kg (192 lb 6.4 oz)   SpO2 95%   BMI 26.91 kg/m       Learning needs assessment complete within 12 months? NO    DIAGNOSIS: Multiple myeloma    PROCEDURE: Unilateral Bone Marrow Biopsy and Unilateral Aspirate    [place] [facility]    Patient s identification was positively verified by verbal identification and invasive procedure safety checklist was completed. Informed consent was obtained. Following the administration of Midazolam as pre-medication, patient was placed in the prone position and prepped and draped in a sterile manner. Approximately 10 cc of 1% Lidocaine was used over the right posterior iliac spine. Following this a 3 mm incision was made. Trephine bone marrow core(s) was (were) obtained from the [facility]. Bone marrow aspirates were obtained from the [facility]. Aspirates were sent for morphology, immunophenotyping, cytogenetics, molecular diagnostics RFLP, research studies (TTL), and clonoseq. A total of approximately 50 ml of marrow was aspirated. Following this procedure a sterile dressing was applied to the bone marrow biopsy site(s). The patient was placed in the supine position to maintain pressure on the biopsy site. Post-procedure wound care instructions were given.     Complications: NO    Pre-procedural pain: 0 out of 10 on the numeric pain rating scale.     Procedural pain: 2 out of 10 on the numeric pain rating scale.     Post-procedural pain assessment: 0 out of 10 on the numeric pain rating scale.     Interventions: NO    Length of procedure:20 minutes or less      Procedure performed by: Prabha QUINONES

## 2024-01-10 NOTE — PROGRESS NOTES
"Pharmacy Assessment - Pre-Stem Cell Transplant    Assessments & Recommendations:  1) Consider endocrine consult to help manage the oral hypoglycemic medications during the peritransplant period.   2) Consider holding the glipizide until engraftment  3) Consider holding the metformin during the gi toxicity period.   4) Hold ASA and lipitor during the peritransplant period     If this patient is admitted under observation, the patient may bring in their own supply of the following medication for use in the hospital:  1) none  -Per \"Medications Not Supplied by Pharmacy\" policy (available on [employer])    History of Present Illness:  Dutch Mckeon is a 73 year old year old male diagnosed with multiple myeloma.  he has been treated with D-RVd.  he is now being work up for autologous Stem Cell Transplant on protocol [id number], which utilizes Melphalan as a conditioning regimen.    Pertinent labs/tests:  Viral Testing:  CMV(NA) / HSV(-/-) / EBV(+) / VZV (?)  Ejection Fraction: _____% (future date)  QTc: 466msec (1/9)    Weights:   Wt Readings from Last 3 Encounters:   01/09/24 89.3 kg (196 lb 14.4 oz)   10/20/23 92.1 kg (203 lb)   11/13/15 104.6 kg (230 lb 11.2 oz)   Ideal body weight: 75.1 kg (165 lb 8.5 oz)  Adjusted ideal body weight: 80.8 kg (178 lb 1.2 oz)  % IBW:  119  There is no height or weight on file to calculate BMI.    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  A delay in compatible RBCs may ocur       Current Medications (pre-admit):  Current Outpatient Medications "   Medication Sig Dispense Refill    ACCU-CHEK GUIDE test strip TEST ONE STRIP THREE TIMES DAILY AS DIRECTED      acetaminophen (TYLENOL) 500 MG tablet Take 1000 mg by mouth 1-3 hours before first three doses of daratumumab chemotherapy on days 1, 8, and 15 of cycle 1. Max acetaminophen dose: 4000 mg in 24 hrs.      acyclovir (ZOVIRAX) 400 MG tablet Take 1 tablet by mouth 2 times daily      allopurinol (ZYLOPRIM) 300 MG tablet       ALLOPURINOL PO Take 50 mg by mouth daily      ASPIRIN EC PO Take 81 mg by mouth daily      atorvastatin (LIPITOR) 80 MG tablet       ATORVASTATIN CALCIUM PO Take 1 tablet by mouth daily      bortezomib (VELCADE) 3.5 MG injection Inject into the vein once      dexAMETHasone (DECADRON) 4 MG tablet Take 20 mg by mouth once daily on days 1, 8 and 15 of each 21 day cycle. On cycle 1, take 1-3 hours before first three doses of daratumumab chemotherapy.      diphenhydrAMINE (BENADRYL) 25 MG capsule       Ezetimibe (ZETIA PO) Take 1 tablet by mouth daily      ezetimibe (ZETIA) 10 MG tablet       gabapentin (NEURONTIN) 100 MG capsule Take 100 mg by mouth      glipiZIDE (GLUCOTROL) 5 MG tablet  (Patient not taking: Reported on 1/9/2024)      LENalidomide (REVLIMID) 25 MG CAPS capsule       METFORMIN HCL PO Take 1,000 mg by mouth 2 times daily (with meals)       multivitamin, therapeutic with minerals (MULTI-VITAMIN) TABS Take 1 tablet by mouth daily      pantoprazole (PROTONIX) 40 MG EC tablet       senna (SENOKOT) 8.6 MG tablet Take 2 tablets at bedtime, but you can take up to 4 tablets if you need it      sulfamethoxazole-trimethoprim (BACTRIM/SEPTRA) 8 mg/mL suspension  (Patient not taking: Reported on 1/9/2024)      UNABLE TO FIND MEDICATION NAME: daratumumab      VITAMIN D, CHOLECALCIFEROL, PO Take 1,000 Units by mouth daily         Herbal Medication/Nutritional Supplements:  No issues     Smoking/Past Drug Use:  No issues     Nausea/Vomiting, Pain, or other issues:  No issues     Summary:  I  met with Dutch Mckeon for approximately 45 minutes.  We discussed his current and transplant medications including the priming options (filgrastim, mozobil) conditioning chemotherapy (melphalan), antiemetics ( ondansetron, dexamethasone, palonosetron, prochlorperazine) , prophylactic antibiotics (levofloxacin, acyclovir, fluconazole, bactrim) and miscellaneous medications ( allopurinol, pantoprazole, filgrastim, claritin and vaccinations) .

## 2024-01-10 NOTE — NURSING NOTE
"Oncology Rooming Note    January 10, 2024 11:18 AM   Dutch Mckeon is a 73 year old male who presents for:    Chief Complaint   Patient presents with    Blood Draw     Labs drawn with piv start by rn.  VS taken.    Bone Marrow Biopsy     Work up for bmt      Initial Vitals: BP (!) 146/81   Pulse 60   Temp 97.8  F (36.6  C) (Oral)   Resp 16   Wt 87.3 kg (192 lb 6.4 oz)   SpO2 99%   BMI 26.91 kg/m   Estimated body mass index is 26.91 kg/m  as calculated from the following:    Height as of 10/20/23: 1.801 m (5' 10.91\").    Weight as of this encounter: 87.3 kg (192 lb 6.4 oz). Body surface area is 2.09 meters squared.  No Pain (0) Comment: Data Unavailable   No LMP for male patient.  Allergies reviewed: Yes  Medications reviewed: Yes    Medications: Medication refills not needed today.  Pharmacy name entered into [employer]:    [facility] - [id number] - [place], [place] - [address]    Frailty Screening:   Is the patient here for a new oncology consult visit in cancer care? 2. No      Clinical concerns: none       Obdulia Fuller RN              "

## 2024-01-10 NOTE — NURSING NOTE
BMBX Teaching and Assessment       Teaching concerns addressed: Bone marrow biopsy and infection prevention.     Person(s) involved in teaching: Patient  Motivation Level  Asks Questions: Yes  Eager to Learn: Yes  Cooperative: Yes  Receptive (willing/able to accept information): Yes    Patient demonstrates understanding of the following:     Reason for the appointment, diagnosis and treatment plan: Yes  Knowledge of proper use of medications and conditions for which they are ordered (with special attention to potential side effects or drug interactions): Yes  Which situations necessitate calling provider and whom to contact: Yes    Teaching concerns addressed:   Reviewed activity restrictions if received premeds, potential for bleeding and actions to take if develops any of the issues below    Pain management techniques: Yes  Patient instructed on hand hygiene: Yes  How and/when to access community resources: Yes    Infection Control:  Patient demonstrates understanding of the following:   Bone marrow procedure site care taught: Yes  Signs and symptoms of infection taught: Yes       Instructional Materials Used/Given: Pt instructed to keep bmbx site clean and dry for 24hrs. Pt educated to monitor site for signs of infection such as redness, rash, oozing, puss, bleeding, pain, and elevated temp. Pt instructed to go to call the [facility] triage line or go to the ER if any signs of infection should occur. Pt educated to not operate machinery if receiving versed. Pt and wife verbalize understanding.     Pre-procedure labs drawn via venipuncture. Post procedure: Patient vital signs stable, ambulating, site is clean, dry and intact prior to discharge and line removed. Pt discharged with wife as .

## 2024-01-10 NOTE — NURSING NOTE
Chief Complaint   Patient presents with    Blood Draw     Labs drawn with piv start by rn.  VS taken.     Labs drawn with PIV start by rn.  Pt tolerated well.  VS taken and pt checked in for next appt.    Jli Huynh RN

## 2024-01-10 NOTE — LETTER
1/10/2024         RE: Dutch Mckeon  [address]  [place] [address]        Dear Colleague,    Thank you for referring your patient, Dutch Mckeon, to the [facility] BLOOD AND MARROW TRANSPLANT PROGRAM [place]. Please see a copy of my visit note below.    BMT ONC Adult Bone Marrow Biopsy Procedure Note  January 10, 2024  BP (!) 142/80   Pulse 72   Temp 98.1  F (36.7  C) (Oral)   Resp 18   Wt 87.3 kg (192 lb 6.4 oz)   SpO2 95%   BMI 26.91 kg/m       Learning needs assessment complete within 12 months? NO    DIAGNOSIS: Multiple myeloma    PROCEDURE: Unilateral Bone Marrow Biopsy and Unilateral Aspirate    LOCATION: [facility] 2nd Floor    Patient s identification was positively verified by verbal identification and invasive procedure safety checklist was completed. Informed consent was obtained. Following the administration of Midazolam as pre-medication, patient was placed in the prone position and prepped and draped in a sterile manner. Approximately 10 cc of 1% Lidocaine was used over the right posterior iliac spine. Following this a 3 mm incision was made. Trephine bone marrow core(s) was (were) obtained from the [facility]. Bone marrow aspirates were obtained from the [facility]. Aspirates were sent for morphology, immunophenotyping, cytogenetics, molecular diagnostics RFLP, research studies (TTL), and clonoseq. A total of approximately 50 ml of marrow was aspirated. Following this procedure a sterile dressing was applied to the bone marrow biopsy site(s). The patient was placed in the supine position to maintain pressure on the biopsy site. Post-procedure wound care instructions were given.     Complications: NO    Pre-procedural pain: 0 out of 10 on the numeric pain rating scale.     Procedural pain: 2 out of 10 on the numeric pain rating scale.     Post-procedural pain assessment: 0 out of 10 on the numeric pain rating scale.     Interventions: NO    Length of procedure:20 minutes or less       Procedure performed by: Prabha QUINONES

## 2024-01-10 NOTE — PROGRESS NOTES
BMT Teaching Flowsheet    Dutch Mckeon is a 73 year old male    Blood and Marrow Transplant - Workup Calendar Review    Dutch Mckeon is a 73 year old male  Diagnoses of Multiple myeloma in remission (H), Examination prior to chemotherapy, Personal history of diseases of blood and blood-forming organs, and Vitamin D deficiency were pertinent to this visit.    Teaching Topic: work up routine  Person(s) involved: Patient and Spouse    Patient demonstrates understanding of the following:  - Reason for the appointment, diagnosis and treatment plan: Yes    Reviewed calendar and locations of procedures. Patient understands to check in for appointments at the  and to contact the BMT Office [phone] if there are schedule questions.    24 hr urine collection needed? Yes   If yes: Patient instructed to  collection container in lab. Patient will drop off container on 1/11 and will have corresponding lab appointment scheduled on 1/11.    Instructional Materials Used/Given: copy of calendar, map of campus    Specific Concerns: Yes: Pt has a guest unit available in Feb in their building. Pt would like to time the transplant with the availability of that accomodation.     Time spent with patient: 30 minutes     Diagnoses of Multiple myeloma in remission (H), Examination prior to chemotherapy, Personal history of diseases of blood and blood-forming organs, and Vitamin D deficiency were pertinent to this visit.    Teaching Topic: 2016-35 Auto    Person(s) involved in teaching: Patient and Spouse    Motivation Level  Asks Questions: Yes  Eager to Learn: Yes  Cooperative: Yes  Receptive (willing/able to accept information): Yes  Any cultural factors/Jehovah's witness beliefs that may influence understanding or compliance? No    Patient and Caregiver demonstrates understanding of the following:   Reason for the appointment, diagnosis and treatment plan: Yes  Knowledge of proper use of medications and conditions for  "which they are ordered (with special attention to potential side effects or drug interactions): Yes  Which situations necessitate calling provider and whom to contact: Yes  Proper use and care of (medical equipment, care aids, etc.) Yes  Pain management techniques: Yes  How and/when to access community resources: Yes    Patient was provided with handouts for caring for their central venous catheter (proper hand hygiene, changing end caps, flushing line, changing CVC dressing). Yes    Patient was encouraged to view step-by-step instructional videos for central venous catheter care and report any questions or concerns. Yes     Infection Control:  Patient and Caregiver instructed on hand hygiene: Yes  Signs and symptoms of infection taught: Yes    Instructional Materials Used/Given:   Teaching/ learning concerns addressed: Patient was given and reviewed BMT Auto Transplant Teaching Binder, including: medication pamphlets, sample treatment calendars, consents, contact information for \"When to Call for Help\" (including after-hours contact), post-transplant precautions and guidelines.  Patient was encouraged to call with any additional questions.  .    Time spent with patient: 60 minutes.  Specific Concerns: Yes  "

## 2024-01-11 ENCOUNTER — HOSPITAL ENCOUNTER (OUTPATIENT)
Dept: GENERAL RADIOLOGY | Facility: CLINIC | Age: 74
Discharge: HOME | End: 2024-01-11
Attending: INTERNAL MEDICINE
Payer: MEDICARE

## 2024-01-11 ENCOUNTER — HOSPITAL ENCOUNTER (OUTPATIENT)
Dept: CARDIOLOGY | Facility: CLINIC | Age: 74
Discharge: HOME | End: 2024-01-11
Attending: INTERNAL MEDICINE
Payer: MEDICARE

## 2024-01-11 ENCOUNTER — HOSPITAL ENCOUNTER (OUTPATIENT)
Dept: PET IMAGING | Facility: CLINIC | Age: 74
Discharge: HOME | End: 2024-01-11
Attending: INTERNAL MEDICINE
Payer: MEDICARE

## 2024-01-11 DIAGNOSIS — Z01.818 EXAMINATION PRIOR TO CHEMOTHERAPY: ICD-10-CM

## 2024-01-11 DIAGNOSIS — C90.01 MULTIPLE MYELOMA IN REMISSION (H): ICD-10-CM

## 2024-01-11 DIAGNOSIS — Z86.2 PERSONAL HISTORY OF DISEASES OF BLOOD AND BLOOD-FORMING ORGANS: ICD-10-CM

## 2024-01-11 DIAGNOSIS — E55.9 VITAMIN D DEFICIENCY: ICD-10-CM

## 2024-01-11 LAB
ALBUMIN SERPL ELPH-MCNC: 3.9 G/DL (ref 3.7–5.1)
ALPHA1 GLOB SERPL ELPH-MCNC: 0.4 G/DL (ref 0.2–0.4)
ALPHA2 GLOB SERPL ELPH-MCNC: 0.9 G/DL (ref 0.5–0.9)
B-GLOBULIN SERPL ELPH-MCNC: 0.8 G/DL (ref 0.6–1)
DLCOCOR-%PRED-PRE: 68 %
DLCOCOR-PRE: 17.63 ML/MIN/MMHG
DLCOUNC-%PRED-PRE: 65 %
DLCOUNC-PRE: 16.72 ML/MIN/MMHG
DLCOUNC-PRED: 25.63 ML/MIN/MMHG
DONOR CYTOMEGALOVIRUS ABY: POSITIVE
DONOR HEP B CORE ABY: ABNORMAL
DONOR HEP B SURF AGN: ABNORMAL
DONOR HEPATITIS C ABY: ABNORMAL
DONOR HTLV 1&2 ANTIBODY: ABNORMAL
DONOR TREPONEMA PAL ABY: ABNORMAL
ERV-%PRED-PRE: 119 %
ERV-PRE: 1.72 L
ERV-PRED: 1.44 L
EXPTIME-PRE: 5.07 SEC
FEF2575-%PRED-PRE: 227 %
FEF2575-PRE: 4.97 L/SEC
FEF2575-PRED: 2.18 L/SEC
FEFMAX-%PRED-PRE: 138 %
FEFMAX-PRE: 11.16 L/SEC
FEFMAX-PRED: 8.06 L/SEC
FEV1-%PRED-PRE: 142 %
FEV1-PRE: 4.14 L
FEV1FEV6-PRE: 85 %
FEV1FEV6-PRED: 77 %
FEV1FVC-PRE: 83 %
FEV1FVC-PRED: 76 %
FEV1SVC-PRE: 85 %
FEV1SVC-PRED: 70 %
FIFMAX-PRE: 9.24 L/SEC
FRCPLETH-%PRED-PRE: 133 %
FRCPLETH-PRE: 5.03 L
FRCPLETH-PRED: 3.77 L
FVC-%PRED-PRE: 129 %
FVC-PRE: 4.97 L
FVC-PRED: 3.84 L
GAMMA GLOB SERPL ELPH-MCNC: 0.3 G/DL (ref 0.7–1.6)
HBV DNA SERPL QL NAA+PROBE: NORMAL
HCV RNA SERPL QL NAA+PROBE: NORMAL
HIV1+2 AB SERPL QL IA: ABNORMAL
HIV1+2 RNA SERPL QL NAA+PROBE: NORMAL
IC-%PRED-PRE: 109 %
IC-PRE: 3.14 L
IC-PRED: 2.88 L
IGD SER-MCNC: <1.3 MG/DL
LOCATION OF TASK: ABNORMAL
LVEF ECHO: NORMAL
M PROTEIN SERPL ELPH-MCNC: 0.1 G/DL
PATH REPORT.COMMENTS IMP SPEC: NORMAL
PATH REPORT.FINAL DX SPEC: NORMAL
PATH REPORT.FINAL DX SPEC: NORMAL
PATH REPORT.GROSS SPEC: NORMAL
PATH REPORT.MICROSCOPIC SPEC OTHER STN: NORMAL
PATH REPORT.RELEVANT HX SPEC: NORMAL
PATH REPORT.RELEVANT HX SPEC: NORMAL
PROT PATTERN SERPL ELPH-IMP: ABNORMAL
RVPLETH-%PRED-PRE: 120 %
RVPLETH-PRE: 3.31 L
RVPLETH-PRED: 2.74 L
SCANNED LAB RESULT: NORMAL
T GONDII IGG SER-ACNC: <3 IU/ML
T GONDII IGM SER-ACNC: <3 AU/ML
TLCPLETH-%PRED-PRE: 113 %
TLCPLETH-PRE: 8.17 L
TLCPLETH-PRED: 7.23 L
TRYPANOSOMA CRUZI: ABNORMAL
VA-%PRED-PRE: 99 %
VA-PRE: 6.48 L
VC-%PRED-PRE: 117 %
VC-PRE: 4.86 L
VC-PRED: 4.14 L
WNV RNA SERPL DONR QL NAA+PROBE: NORMAL

## 2024-01-11 PROCEDURE — G1010 CDSM STANSON: HCPCS | Performed by: RADIOLOGY

## 2024-01-11 PROCEDURE — A9552 F18 FDG: HCPCS | Performed by: INTERNAL MEDICINE

## 2024-01-11 PROCEDURE — 71046 X-RAY EXAM CHEST 2 VIEWS: CPT | Mod: 26 | Performed by: RADIOLOGY

## 2024-01-11 PROCEDURE — 71046 X-RAY EXAM CHEST 2 VIEWS: CPT

## 2024-01-11 PROCEDURE — 343N000001 HC RX 343: Performed by: INTERNAL MEDICINE

## 2024-01-11 PROCEDURE — 93356 MYOCRD STRAIN IMG SPCKL TRCK: CPT

## 2024-01-11 PROCEDURE — 74177 CT ABD & PELVIS W/CONTRAST: CPT | Mod: 26 | Performed by: RADIOLOGY

## 2024-01-11 PROCEDURE — 71260 CT THORAX DX C+: CPT | Mod: 26 | Performed by: RADIOLOGY

## 2024-01-11 PROCEDURE — 78816 PET IMAGE W/CT FULL BODY: CPT | Mod: MG,PS

## 2024-01-11 PROCEDURE — 93306 TTE W/DOPPLER COMPLETE: CPT | Mod: 26 | Performed by: INTERNAL MEDICINE

## 2024-01-11 PROCEDURE — 93356 MYOCRD STRAIN IMG SPCKL TRCK: CPT | Performed by: INTERNAL MEDICINE

## 2024-01-11 PROCEDURE — 78816 PET IMAGE W/CT FULL BODY: CPT | Mod: 26 | Performed by: RADIOLOGY

## 2024-01-11 RX ADMIN — FLUDEOXYGLUCOSE F-18 11.52 MILLICURIE: 500 INJECTION, SOLUTION INTRAVENOUS at 07:58

## 2024-01-12 ENCOUNTER — TELEPHONE (OUTPATIENT)
Dept: TRANSPLANT | Facility: CLINIC | Age: 74
End: 2024-01-12

## 2024-01-12 LAB
ALBUMIN MFR UR ELPH: <6 MG/DL
COLLECT DURATION TIME UR: 24 H
COLLECT DURATION TIME UR: 24 H
CREAT 24H UR-MRATE: 1.03 G/SPEC (ref 0.98–2.2)
CREAT CL 24H UR+SERPL-VRATE: 103 ML/MIN (ref 66–143)
CREAT CL/1.73 SQ M 24H UR+SERPL-ARVRAT: 86 ML/MIN/1.7M2 (ref 110–180)
CREAT SERPL-MCNC: 0.69 MG/DL (ref 0.67–1.17)
CREAT UR-MCNC: 55.3 MG/DL
HGB S BLD QL: NEGATIVE
INTERPRETATION: NORMAL
PROT 24H UR-MRATE: NORMAL G/(24.H)
SPECIMEN VOL UR: 1855 ML
SPECIMEN VOL UR: 1855 ML

## 2024-01-12 PROCEDURE — 82575 CREATININE CLEARANCE TEST: CPT

## 2024-01-12 PROCEDURE — 86335 IMMUNFIX E-PHORSIS/URINE/CSF: CPT | Mod: 26 | Performed by: PATHOLOGY

## 2024-01-12 PROCEDURE — 84156 ASSAY OF PROTEIN URINE: CPT

## 2024-01-12 PROCEDURE — 86335 IMMUNFIX E-PHORSIS/URINE/CSF: CPT | Performed by: PATHOLOGY

## 2024-01-12 PROCEDURE — 81050 URINALYSIS VOLUME MEASURE: CPT | Performed by: PATHOLOGY

## 2024-01-12 PROCEDURE — 84166 PROTEIN E-PHORESIS/URINE/CSF: CPT | Mod: 26 | Performed by: PATHOLOGY

## 2024-01-12 NOTE — TELEPHONE ENCOUNTER
Reviewed PET scan with Dr James. He requested that pt have evaluation for pulmonary nodules with RMD, and delay auto until completion of treatment or resolution of nodules on follow up chest CT. Pt also had right mandibular FDG. Pt had dental work recently on the left upper, but had not reported discomfort with right lower. Contacted [facility] to request they see patient for follow up regarding PET findings. [facility] requested Dr James coordinate with Dr Romero.     Called and updated patient that we are holding off on moving forward with the work up at this time. Until the lung nodule is addressed, we will delay the auto transplant. Patient understands this change of plan and will await contact from [facility] for follow up.

## 2024-01-13 LAB
DEPRECATED CALCIDIOL+CALCIFEROL SERPL-MC: <58 UG/L (ref 20–75)
VITAMIN D2 SERPL-MCNC: <5 UG/L
VITAMIN D3 SERPL-MCNC: 53 UG/L

## 2024-01-15 LAB
LOCATION OF TASK: NORMAL
LOCATION OF TASK: NORMAL
PROT ELPH PNL UR ELPH: NORMAL
PROT PATTERN UR ELPH-IMP: NORMAL

## 2024-01-16 ENCOUNTER — TELEPHONE (OUTPATIENT)
Dept: TRANSPLANT | Facility: CLINIC | Age: 74
End: 2024-01-16
Payer: MEDICARE

## 2024-01-16 DIAGNOSIS — J18.9 PNEUMONIA OF RIGHT LOWER LOBE DUE TO INFECTIOUS ORGANISM: Primary | ICD-10-CM

## 2024-01-16 RX ORDER — LEVOFLOXACIN 750 MG/1
750 TABLET, FILM COATED ORAL DAILY
Qty: 7 TABLET | Refills: 0 | Status: SHIPPED | OUTPATIENT
Start: 2024-01-16

## 2024-01-16 NOTE — TELEPHONE ENCOUNTER
Called Dr Bond's office to update her on the plan of care. Dr James spoke with the patient and would like the patient to take a course of levaquin. After completing a course of antibiotics, Pt to have a chest CT. Pending resolution of lung issues, pt will proceed with Auto transplant.

## 2024-01-16 NOTE — TELEPHONE ENCOUNTER
I spoke to Mr. Mckeon.  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  He described having a respiratory illness a few months ago with a lingering cough and occasional sputum production that has not resolved.  He has no pain or SOP or fever.  I also mentioned that he is in a stringent CR.      I recommended a course of levaquin to treat a community acquired pneumonia.  Afterward, assuming he feels well, we will plan to repeat a CT chest in one month.  If Ok, we will proceed at that time.  He agrees with the plan.        GABRIEL MEYERS MD  January 16, 2024

## 2024-01-23 ENCOUNTER — CARE COORDINATION (OUTPATIENT)
Dept: TRANSPLANT | Facility: CLINIC | Age: 74
End: 2024-01-23
Payer: MEDICARE

## 2024-01-23 DIAGNOSIS — J18.9 PNEUMONIA OF RIGHT LOWER LOBE DUE TO INFECTIOUS ORGANISM: Primary | ICD-10-CM

## 2024-01-23 NOTE — PROGRESS NOTES
Verified patient consent to future contact for research related to BMT and CT. Documented on [id number] HIPAA consent.

## 2024-01-26 LAB — CULTURE HARVEST COMPLETE DATE: NORMAL

## 2024-01-29 LAB
ABC 95% CONFIDENCE INTERVAL (B-CELL): NORMAL
ABC CLONOSEQ B-CELL CLONALITY (ID) RESULT: NORMAL
ABC CLONOSEQ B-CELL TRACKING (MRD) RESULT: NORMAL
ABC DOMINANT SEQUENCES (B-CELL): 4
ABC DOMINANT SEQUENCES (B-CELL): 4
ABC RESIDUAL CLONAL CELLS/ MILL NUCLEATED CELLS BCELL: 85 PER MILLION NUCLEATED CELLS
ABC TOTAL NUCLEATED CELLS (B-CELL): NORMAL
CULTURE HARVEST COMPLETE DATE: NORMAL
INTERPRETATION: NORMAL

## 2024-01-30 LAB
ADDITIONAL COMMENTS: NORMAL
INTERPRETATION: NORMAL
ISCN: NORMAL
METHODS: NORMAL

## 2024-02-02 ENCOUNTER — TELEPHONE (OUTPATIENT)
Dept: TRANSPLANT | Facility: CLINIC | Age: 74
End: 2024-02-02
Payer: MEDICARE

## 2024-02-02 NOTE — TELEPHONE ENCOUNTER
Called patient, returning voice message. Pt had asked if we could assist with renewing handicapped parking. Pt also requested Dr James speak with the patient regarding some ongoing cough symptoms and concern for delay in treatment. Returned call to patient, will follow up with his RMD for handicapped parking permit. When writer spoke with Dr James regarding bridging chemo while awaiting lung nodule improvement, he said the patient is in a good remission and would not be needed. Patient was concerned that he still has some ongoing cough and is worried that if he were to need subsequent treatment it would further delay his transplant. Writer will reach out to Dr James with these concerns.

## 2024-02-15 ENCOUNTER — TELEPHONE (OUTPATIENT)
Dept: TRANSPLANT | Facility: CLINIC | Age: 74
End: 2024-02-15
Payer: MEDICARE

## 2024-02-15 NOTE — TELEPHONE ENCOUNTER
Called and left a voice mail for the patient to update him on added appointments to his schedule. He will have a repeat CT on 2/19. Follow up scheduled for 2/22 with ACT Dr to review CT and determine if we are proceeding with transplant work up. Pending that result, a lab appointment has bee scheduled for after that visit and a work up close visit has been scheduled for 2/26 pending approval to proceed. Left BMT office phone number to call if there are questions. [phone].

## 2024-02-19 ENCOUNTER — ANCILLARY PROCEDURE (OUTPATIENT)
Dept: CT IMAGING | Facility: CLINIC | Age: 74
End: 2024-02-19
Attending: INTERNAL MEDICINE
Payer: MEDICARE

## 2024-02-19 DIAGNOSIS — J18.9 PNEUMONIA OF RIGHT LOWER LOBE DUE TO INFECTIOUS ORGANISM: ICD-10-CM

## 2024-02-19 PROCEDURE — 71250 CT THORAX DX C-: CPT | Mod: ME | Performed by: STUDENT IN AN ORGANIZED HEALTH CARE EDUCATION/TRAINING PROGRAM

## 2024-02-19 PROCEDURE — G1010 CDSM STANSON: HCPCS | Mod: GC | Performed by: STUDENT IN AN ORGANIZED HEALTH CARE EDUCATION/TRAINING PROGRAM

## 2024-02-22 ENCOUNTER — ONCOLOGY VISIT (OUTPATIENT)
Dept: TRANSPLANT | Facility: CLINIC | Age: 74
End: 2024-02-22
Attending: STUDENT IN AN ORGANIZED HEALTH CARE EDUCATION/TRAINING PROGRAM
Payer: MEDICARE

## 2024-02-22 VITALS
RESPIRATION RATE: 16 BRPM | WEIGHT: 197 LBS | SYSTOLIC BLOOD PRESSURE: 146 MMHG | HEART RATE: 91 BPM | OXYGEN SATURATION: 98 % | DIASTOLIC BLOOD PRESSURE: 77 MMHG | TEMPERATURE: 98.1 F | BODY MASS INDEX: 27.55 KG/M2

## 2024-02-22 DIAGNOSIS — C90.01 MULTIPLE MYELOMA IN REMISSION (H): Primary | ICD-10-CM

## 2024-02-22 DIAGNOSIS — Z86.2 PERSONAL HISTORY OF DISEASES OF BLOOD AND BLOOD-FORMING ORGANS: ICD-10-CM

## 2024-02-22 PROCEDURE — 99215 OFFICE O/P EST HI 40 MIN: CPT

## 2024-02-22 PROCEDURE — G0463 HOSPITAL OUTPT CLINIC VISIT: HCPCS

## 2024-02-22 PROCEDURE — G2211 COMPLEX E/M VISIT ADD ON: HCPCS

## 2024-02-22 ASSESSMENT — PAIN SCALES - GENERAL: PAINLEVEL: NO PAIN (0)

## 2024-02-22 NOTE — PROGRESS NOTES
BMT / Cell Therapy Close Note      Dutch Mckeon is a 74 year old male referred by Dr. Brown for multiple myeloma.      Disease presentation and baseline characteristics:   8/18/23: IgG lambda multiple myeloma, standard risk    Date Treatment Name Response Side Effects / Toxicities   9/12/23 D-VRd x 5 cycles VGPR     XRT to T5-T7            HPI:  Please see my entry above for hematologic history.  Mr. Mckeon presents today to review his pre-transplant workup.  He notes he still has a mild persistent cough but this is significantly improved over the past few weeks.  He denies fevers/chills, night sweats, lumps/bumps, unintentional weight loss, shortness of breath, chest pain, or other acute concerns.  He notes he was feeling sick up until he started levofloxacin and then felt better within a few days.  Energy and appetite are good.  He and his wife have some hesitation about proceeding with transplant and would like to discuss it in more detail.      ASSESSMENT AND PLAN:  We had an extended discussion regarding the role of transplant for multiple myeloma today.  We reviewed the results of the DETERMINATION (DOI: 10.1056/[id number]) trial, which showed a progression free survival benefit for transplant but no overall survival benefit.  This remains consistent with the prior results of the IFM 2009 trial (DOI: 10.1056/[id number]).    After extended discussion Mr. Mckeon and his wife opted against proceeding with transplant, as he would like to focus on quality of life for the next few years.  I think this is reasonable and would recommend he complete an additional 2-5 cycles of D-VRd before moving to maintenance with lenalidomide.  Mr. Mckeon is eligible for transplant based on his workup, but I do not think transplant would be in line with his stated goals.    Omission of transplant does not make Mr. Mckeon ineligible for CAR-T or bispecific therapy at a later date, and he should be referred for these  if/when eligible.    If Mr. Mckeon has an early relapse in the next few years please feel free to reach out to us to discuss possible clinical trials prior to starting new therapy.    No further follow up is planned with BMT; Mr. Mckeon will reach out to his primary oncologist to resume therapy.    Summary: Defer transplant indefinitely, recommend 2-4 additional cycles of D-VRd followed by lenalidomide maintenance    I spent 40 minutes in the care of this patient today, which included time necessary for preparation for the visit, obtaining history, ordering medications/tests/procedures as medically indicated, review of pertinent medical literature, counseling of the patient, communication of recommendations to the care team, and documentation time.    Paul Mayes MD    ROS:    10 point ROS neg other than the symptoms noted above in the HPI.      Past Medical History:   Diagnosis Date    Basal cell cancer     nose    Gout     Hyperlipidemia     Kidney stone     Type 2 diabetes mellitus without complications (H)        Past Surgical History:   Procedure Laterality Date    ENT SURGERY      tonsillectomy    EXCISE LESION HEAD Left 11/13/2015    Procedure: EXCISE LESION HEAD;  Surgeon: Amadeo Liu MD;  Location: [facility]    EXCISE LESION LOWER EXTREMITY  3/21/2014    Procedure: EXCISE LESION LOWER EXTREMITY;  EXCISION LESION RIGHT FOOT FOURTH WEB SPACE;  Surgeon: Amadeo Liu MD;  Location: [facility]    SOFT TISSUE SURGERY      mohs to nose       Social History     Tobacco Use    Smoking status: Never   Substance Use Topics    Alcohol use: Yes    Drug use: No         Allergies   Allergen Reactions    Blood Transfusion Related (Informational Only) Other (See Comments)     Patient has a history of positive antibody screen due to Passive Drug Antibody (Anti-CD38).  A delay in compatible RBCs may ocur        Current Outpatient Medications   Medication Sig Dispense Refill    ACCU-CHEK GUIDE test strip  TEST ONE STRIP THREE TIMES DAILY AS DIRECTED      acyclovir (ZOVIRAX) 400 MG tablet Take 1 tablet by mouth 2 times daily      allopurinol (ZYLOPRIM) 300 MG tablet       ALLOPURINOL PO Take 50 mg by mouth daily (Patient not taking: Reported on 1/10/2024)      ASPIRIN EC PO Take 81 mg by mouth daily      atorvastatin (LIPITOR) 80 MG tablet       ezetimibe (ZETIA) 10 MG tablet       gabapentin (NEURONTIN) 100 MG capsule Take 100 mg by mouth 3 times daily      glipiZIDE (GLUCOTROL) 5 MG tablet  (Patient not taking: Reported on 1/9/2024)      levofloxacin (LEVAQUIN) 750 MG tablet Take 1 tablet (750 mg) by mouth daily 7 tablet 0    METFORMIN HCL PO Take 1,000 mg by mouth 2 times daily (with meals)       multivitamin, therapeutic with minerals (MULTI-VITAMIN) TABS Take 1 tablet by mouth daily      senna (SENOKOT) 8.6 MG tablet Take 2 tablets at bedtime, but you can take up to 4 tablets if you need it      sulfamethoxazole-trimethoprim (BACTRIM/SEPTRA) 8 mg/mL suspension  (Patient not taking: Reported on 1/9/2024)      VITAMIN D, CHOLECALCIFEROL, PO Take 1,000 Units by mouth daily           Physical Exam:     Vital Signs: BP (!) 146/77   Pulse 91   Temp 98.1  F (36.7  C) (Oral)   Resp 16   Wt 89.4 kg (197 lb)   SpO2 98%   BMI 27.55 kg/m      KPS:  70  General Appearance: alert, and no distress  Eyes: PERRL, conjunctiva and lids normal, sclera nonicteric  Ears/Nose/M/Throat: Oral mucosa and posterior oropharynx normal, moist mucous membranes  Neck supple, non-tender, free range of motion, no adenopathy  Cardio/Vascular:regular rate and rhythm, normal S1 and S2, no murmur  Resp Effort And Auscultation: Normal - Clear to auscultation without rales, rhonchi, or wheezing.  GI: soft, nontender, bowel sounds present in all four quadrants, no hepatosplenomegaly  Lymphatics:no significant enlargement of lymph nodes globally   Musculoskeletal: Musculoskeletal normal  Edema: none  Skin: Skin color, texture, turgor normal. No  rashes or lesions.  Neurologic: Utilizes walker.  Sensation grossly WNL.  Psych/Affect: Mood and affect are appropriate.    Blood Counts     Recent Labs   Lab Test 01/10/24  1049 01/09/24  1310   HGB 12.4* 12.9*   HCT 37.9* 39.7*   WBC 4.8 4.3   ANEUTAUTO 3.2 3.0   ALYMPAUTO 1.1 0.9   AMONOAUTO 0.4 0.3   AEOSAUTO 0.1 0.1   ABSBASO 0.1 0.1   NRBCMAN 0.0 0.0    316       Chemistries     Basic Panel  Recent Labs   Lab Test 01/12/24  1146 01/09/24  1310   NA  --  140   POTASSIUM  --  4.3   CHLORIDE  --  103   CO2  --  25   BUN  --  12.1   CR 0.69 0.69   GLC  --  170*        Calcium, Magnesium, Phosphorus  Recent Labs   Lab Test 01/09/24  1310   LEANNE 9.8   MAG 1.9   PHOS 3.5        LFTs  Recent Labs   Lab Test 01/09/24  1310   BILITOTAL 0.6   ALKPHOS 95   AST 27   ALT 18   ALBUMIN 4.5       LDH  Recent Labs   Lab Test 01/09/24  1310          B2-Microglobulin  Recent Labs   Lab Test 01/09/24  1310   SXAJ7OORY 2.1         Immunoglobulins     Recent Labs   Lab Test 01/09/24  1310   *       Recent Labs   Lab Test 01/09/24  1310   IGA 26*       Recent Labs   Lab Test 01/09/24  1310   IGM 25*         Monocloncal Protein Studies     M spike    Recent Labs   Lab Test 01/09/24  1310   ELPM 0.1*       [place] FLC    Recent Labs   Lab Test 01/09/24  1310   KFLCA 0.65       Lambda FLC    Recent Labs   Lab Test 01/09/24  1310   LFLCA 0.37*       FLC Ratio    Recent Labs   Lab Test 01/09/24  1310   KLRA 1.76*       Bone Marrow Biopsy       Morphology    Results for orders placed or performed in visit on 01/10/24 (from the past 8760 hour(s))   Bone marrow biopsy   Result Value    Final Diagnosis      Bone marrow, posterior iliac crest, right decalcified trephine biopsy and touch imprint; particle crush, direct aspirate smear, and concentrated aspirate smear; and peripheral smear:  - No morphologic or immunophenotypic evidence of recurrent/persistent plasma cell neoplasm  - Normocellular marrow (cellularity is  estimated at 30-40%) with trilineage hematopoiesis, no overt dysplasia, no increase in blasts and <1% polytypic plasma cell.  - Peripheral blood showing slight normochromic normocytic anemia   - See comment       Comment      Flow cytometry analysis on concurrent specimen ([id number]) showed:  -Polytypic B cells  -Rare to absent plasma cells    Concurrent ancillary studies are in progress and will be reported separately.  Correlation with the results of protein immunofixation studies, serum protein electrophoresis and serum free light chain studies is recommended.      Clinical Information      Per Epic electronic medical records; 73 year old male who was diagnosed on 8/18/2023 at an outside hospital with IgG lambda plasma cell myeloma who is status post daratumumab, Velcade, Revlimid, dexamethasone. Protein serum electrophoresis done on 1/9/2024 show decreased total protein and a slightly elevated kappa/lambda ratio (1.76). He is undergoing evaluation for a bone marrow transplant.        Peripheral Hematologic Data      CBC WITH DIFFERENTIAL (01/10/2024 11:01 AM CST):     RESULT VALUE REF. RANGE UNITS   WBC Count   Hemoglobin    Hematocrit   Platelet Count   RBC Count   MCV  MCH  MCHC   RDW  4.8 (NORMAL)     12.4  ( L )      37.9  ( L )  304 (NORMAL)   4.32  ( L )       88 (NORMAL)     28.7 (NORMAL)     32.7 (NORMAL)      16.3  ( H ) 4.0-11.0  13.3-17.7  40.0-53.0  150-450  4.40-5.90    26.5-33.0  31.5-36.5  10.0-15.0 10e3/uL  g/dL  %  10e3/uL  10e6/uL  fL  pg  g/dL  %   % Neutrophils  % Lymphocytes  % Monocytes  % Eosinophils  % Basophils  % Immature Granulocytes  Absolute Neutrophils  Absolute Lymphocytes  Absolute Monocytes  Absolute Eosinophils  Absolute Basophils  Absolute Immature Granulocytes  NRBCs per 100 WBC  Absolute NRBCs 65  22  8  2  2  1  3.2 (NORMAL)  1.1 (NORMAL)  0.4 (NORMAL)  0.1 (NORMAL)  0.1 (NORMAL)  0.0 (NORMAL)  0 (NORMAL)  0.0 ()  N/A  N/A  N/A  N/A  N/A  N/A  1.6-8.3  0.8-5.3  0.0-1.3  0.0-0.7  0.0-0.2  <=0.4  <1  <=0.0 %  %  %  %  %  %  10e3/uL  10e3/uL  10e3/uL  10e3/uL  10e3/uL  10e3/uL  /100  10e3/uL       Microscopic Description      PERIPHERAL BLOOD SMEAR MORPHOLOGY:  The red blood cells appear normochromic. Poikilocytosis  includes occasional elliptocytes, occasional echinocytes, rare fragmented red blood cells and rare bite cell. Polychromasia is not increased. Rouleaux formation is not increased. Lymphocyte morphology is polymorphous, with predominantly small and mature forms. Neutrophils display predominantly normal cytoplasmic granularity and unremarkable nuclear morphology. The morphology of the platelets unremarkable.    Bone marrow aspirates and bone marrow trephine core biopsy touch imprints are reviewed.    BONE MARROW DIFFERENTIAL (500 cells on bone marrow biopsy touch imprints )  Percent Cell (reference range)  0 Blasts (0 - 1)  1 Neutrophil promyelocytes (2 - 4)  54 Neutrophils and precursors (54 - 63)  37 Erythroid precursors (18 - 24)  1 Monocytes (1 - 1.5)  3 Eosinophils (1 - 3)  0 Basophils (0 - 1)  4 Lymphocytes (8 - 12)  0 Plasma cells (0 - 1.5)    The aspirate smears are hemodilute, therefore the above differential is performed on the bone marrow trephine core biopsy touch imprints..    Granulocytes are adequate in number with progressive and complete maturation; no overt dysplasia seen. Erythrocytes are relatively increased in number with progressive and complete maturation; no overt dysplasia seen. Megakaryocytes with unremarkable morphology are present.     Particle crush slides are reviewed and do not significantly differ from the aspirate smears or touch imprints.    IRON STAINS:  Dacie iron stain on concentrate smears:  Iron stains show 10% sideroblasts. No ring sideroblasts are seen on scanning.     Prussian blue stain on particle crush:   Marrow iron stores are reduced.    TREPHINE SECTIONS:  Hematoxylin and  eosin stains are reviewed. The quality of the trephine core biopsy is adequate with slight fragmentation. The marrow cellularity is estimated at 30-40%. The cellular composition reflects the differential. It shows trilineage hematopoiesis. Megakaryocytes are present and scattered throughout the marrow with unremarkable morphology and distribution. There is no atypical infiltration of plasma cells.     IMMUNOHISTOCHEMISTRY:  Immunohistochemical stains are performed on the paraffin-embedded trephine core with appropriate controls.    Stains for , cytoplasmic kappa and lambda immunoglobulin light chains show scattered polytypic plasma cells comprising <1% of marrow cellularity; no clusters or aggregates of plasma cells noted.    Note: These immunohistochemical stains are deemed medically necessary. Some of the antigens may also be evaluated by flow cytometry. Concurrent evaluation by immunohistochemistry on clot and/or trephine sections is indicated in this case in order to correlate immunophenotype with cell morphology and determine extent of involvement, spatial pattern, and focality of potential disease distribution.    Case was reviewed by the following:  Resident Pathologist: Rizwana South MD  A resident or fellow in a training program was involved in the initial review, preparation, and/or interpretation of this case.  I, as the senior physician, attest that I have personally reviewed all specimens and or slides, including the listed special stains, and used them with my medical judgement to determine the final diagnosis.              Gross Description      Procedure/Gross Description   Aspirate(s) and trephine(s) procured by JEFF Castillo    Specimen sent for Special Studies:         Flow Cytometry: right aspirate        Cytogenetics: right aspirate        Molecular Diagnostics: right aspirate          Biopsy aspiration site: right posterior iliac crest                                                       (Reference Range)          Amount of aspirate           3.0   mL        Fat and P.V. cell layer        1    %               (1 - 3)        Particles                             trace   %        Myeloid-erythroid layer    1    %               (5 - 8)          Clot Section: no    Trephine biopsy site: right posterior iliac crest    Designated right posterior iliac crest is 1 cylinder of gritty tissue, labeled with the patient's name and hospital number, obtained with 11 gauge needle and a length of 14 mm; entirely submitted in 1 cassette; acetic zinc formalin fixed, decalcified, processed, and stained for hematoxylin and eosin per laboratory protocol.        Performing Labs      The technical component of this testing was completed at [facility]           Echocardiogram       Results for orders placed during the hospital encounter of 24    ECHOCARDIOGRAM COMPLETE    Status: Normal 2024    Narrative  [id number]  [id number]  [id number]  [id number]^GABRIEL^[place]    [facility],[place]  Echocardiography Laboratory  [address] [place] [id number]    Name: MACI WHITE  MRN: [id number]  : 1950  Study Date: 2024 09:22 AM  Age: 73 yrs  Gender: Male  Patient Location: [facility]  Reason For Study: Multiple myeloma in remission (H), Baseline for Chemo  Ordering Physician: GABRIEL MEYERS  Referring Physician: GABRIEL MEYERS  Performed By: Naila Beth RDCS    BSA: 2.1 m2  Height: 70 in  Weight: 192 lb  HR: 78  BP: 124/62 mmHg  ______________________________________________________________________________  Procedure  Echocardiogram with two-dimensional, color and spectral Doppler performed. 3D  image acquisition, reconstruction, and real-time interpretation was performed.  ______________________________________________________________________________  Interpretation Summary  Global and  regional left ventricular function is normal with an EF of 55-60%.  Global right ventricular function is normal.  Mild mitral insufficiency is present.  Estimated mean right atrial pressure is normal.  Trivial pericardial effusion is present.    ______________________________________________________________________________  Left Ventricle  Left ventricular size is normal. Left ventricular wall thickness is normal.  Global and regional left ventricular function is normal with an EF of 55-60%.  Global peak LV longitudinal strain is averaged at -17%. This is within  reported normal limits (low-normal). No regional wall motion abnormalities are  seen.    Right Ventricle  The right ventricle is normal size. Global right ventricular function is  normal.    Atria  Both atria appear normal.    Mitral Valve  Mild mitral insufficiency is present.    Aortic Valve  The aortic valve is tricuspid. Mild aortic valve calcification is present. No  aortic stenosis is present.    Tricuspid Valve  Trace to mild tricuspid insufficiency is present. The peak velocity of the  tricuspid regurgitant jet is not obtainable. Pulmonary artery systolic  pressure cannot be assessed.    Pulmonic Valve  The pulmonic valve is normal.    Vessels  The aorta root is normal. The inferior vena cava is normal. Estimated mean  right atrial pressure is normal.    Pericardium  Trivial pericardial effusion is present.    Compared to Previous Study  Previous study not available for comparison.  ______________________________________________________________________________  MMode/2D Measurements & Calculations  IVSd: 0.84 cm    LVIDd: 5.2 cm  LVIDs: 3.2 cm  LVPWd: 0.78 cm  FS: 37.7 %  LV mass(C)d: 145.4 grams  LV mass(C)dI: 70.9 grams/m2  Ao root diam: 3.7 cm  asc Aorta Diam: 3.8 cm  LVOT diam: 2.6 cm  LVOT area: 5.4 cm2  Ao root diam index Ht(cm/m): 2.1  Ao root diam index BSA (cm/m2): 1.8  Asc Ao diam index BSA (cm/m2): 1.8  Asc Ao diam index Ht(cm/m): 2.1  LA  Volume (BP): 50.4 ml    LA Volume Index (BP): 24.6 ml/m2  RV Base: 4.4 cm  RWT: 0.30  TAPSE: 2.6 cm    Doppler Measurements & Calculations  MV E max joe: 58.7 cm/sec  MV A max joe: 68.7 cm/sec  MV E/A: 0.86  MV dec slope: 231.5 cm/sec2  MV dec time: 0.25 sec  LV V1 max PG: 3.4 mmHg  LV V1 max: 91.7 cm/sec  LV V1 VTI: 18.6 cm  SV(LVOT): 101.3 ml  SI(LVOT): 49.4 ml/m2  PA acc time: 0.06 sec  E/E' av.0  Lateral E/e': 6.5  Medial E/e': 7.4  RV S Joe: 15.4 cm/sec    Measurements from [facility]  LV GLS Endo Peak A2C (AS): -19.0 %  LV GLS Endo Peak A3C (AS): -18.1 %  LV GLS Endo Peak A4C (AS): -14.8 %  LV GLS Endo Peak Avg (AS): -17.3 %  ______________________________________________________________________________  Report approved by: Joselyn Hickey 2024 11:52 AM        PET Scan       Results for orders placed during the hospital encounter of 24    PET ONCOLOGY WHOLE BODY    Status: Normal 2024    Narrative  Combined Report of: PET and CT on 2024 9:13 AM:    1. PET of the neck, chest, abdomen, and pelvis.  2. PET CT Fusion for Attenuation Correction and Anatomical  Localization.  3. Diagnostic CT of the chest, abdomen and pelvis with intravenous  contrast obtained for diagnostic interpretation.  4. 3D MIP and PET-CT fused images were processed on an independent  workstation and archived to PACS and reviewed by a radiologist.    Technique:    1. PET: The patient received 11.5 to mCi of F-18-FDG. The serum  glucose was 105 prior to administration. Body weight was 87 kg. Images  were evaluated in the axial, sagittal, and coronal planes as well as  the rotational whole body MIP. Images were acquired from the cranial  vertex to the feet.    UPTAKE WAS MEASURED AT 60 MINUTES.    BACKGROUND: Liver SUV max = 2.58, Aorta Blood SUV Max = 2.44.    2. CT: Volumetric acquisition for clinical interpretation of the  chest, abdomen, and pelvis acquired at 3 mm sections. The chest,  abdomen, and pelvis were  evaluated at 5 mm sections in bone, soft  tissue, and lung windows.    Contrast and Medications:  IV contrast: None.  PO contrast: none.  Additional Medications: None.    3. 3D MIP and PET-CT fused images were processed on an independent  workstation and archived to PACS and reviewed by a radiologist.    INDICATION: myeloma bmt reina; Multiple myeloma in remission (H);  Examination prior to chemotherapy; Personal history of diseases of  blood and blood-forming organs; Vitamin D deficiency.    ADDITIONAL INFORMATION OBTAINED FROM EMR: Dutch Mckeon?is a 73  year old?year old male?diagnosed with multiple myeloma.??he?has been  treated with D-RVd.??he?is now being work up for autologous?Stem Cell  Transplant . 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  Melanoma right buttock lesion in 10/2023.    COMPARISON: Outside reports 8/12/2023.      FINDINGS:    HEAD/NECK:  Focal uptake right mandible posteriorly.  The paranasal sinuses are  clear. The mastoid air cells are clear.    No hypermetabolic lymph nodes are demonstrated in the neck.    The mucosal and deep spaces of the neck are unremarkable. The major  salivary glands are unremarkable. The thyroid is unremarkable. The  major vasculature of the neck are patent.    CHEST:  Within the posterior elements of T6 involving the posterior elements  of T5 and T7 as well as the paraspinous and adjacent ribs there is a  soft tissue density lesion with mildly increased FDG of the with a max  SUV of 2.8 (series 4 image 138). The lesion measures 5.7 x 3.9 cm (6.8  x 5.4 cm a 1223) There is adjacent bronchiectasis and atelectasis in  the right and left lower lobes and right upper lobe.    Small right pleural effusion. In the right middle lobe there is a  solid pulmonary nodule measuring 11 x 8 mm (series 4 image 171) with a  max SUV of 4.63. There are nearby solid pulmonary nodules including an  11 x 4 mm solid nodule (series 4 image 170) with an FDG avidity of  1.55 and a  6 x 6 mm part solid part groundglass pulmonary nodule  (series 4 image 167) with FDG avidity of 1.95.    The central tracheobronchial tree is clear. No pneumothorax. No acute  consolidation.    No hypermetabolic lymph nodes are demonstrated in the chest.    Heart size is within normal limits. No pericardial effusion. The  thoracic aorta and main pulmonary artery are within normal limits for  diameter. The esophagus is unremarkable.    ABDOMEN AND PELVIS:  There is no suspicious FDG uptake in the abdomen or pelvis.    The liver is unremarkable. The gallbladder is unremarkable. No  intrahepatic or extrahepatic biliary ductal dilatation. The pancreas  is unremarkable. No pancreatic ductal dilatation. The spleen is  unremarkable. The adrenal glands are unremarkable.    Bilateral fluid attenuating simple renal cyst without FDG avidity. In  the right lower pole, there is an enhancing lesion (8/12/2023 contrast  study) measure 1.4 cm max SUV 2.5, highly concerning for renal cell  carcinoma. No hydronephrosis. The urinary bladder is unremarkable. The  reproductive organs are unremarkable.    No hypermetabolic lymph nodes are demonstrated in the abdomen or  pelvis.    Normal caliber of the small and large bowel. Diffusely increased  uptake throughout the large bowel is related to metformin use. No free  air, free fluid, or fluid collection. Normal caliber of the abdominal  aorta.    LOWER EXTREMITIES:  There is no suspicious FDG uptake in the visualized lower extremities.  Focal uptake in the intercondylar notch suggestive of a ligamentous  injury.    BONES:  Increased activity at the left pubic tubercle and about the right and  left hip are degenerative in nature.    Soft tissues: Mild fat stranding increased uptake in the superficial  soft tissues overlying the right buttock (series 4 image 273) which  likely represents posttreatment changes of local excision.    Impression  IMPRESSION:    In this patient with history of  multiple myeloma, T6-7 plasmacytoma,  and melanoma :  1. Plasmacytoma - Ill-defined lytic plasmacytoma T6 lesion 5.7 cm  (prior 8/12/2020 6.8 cm) centered about the posterior elements  replacing most/all of the spinal canal with mild FDG uptake (threshold  between normal and active disease, Max SUV 2.8, similar to liver)).    2. Lungs -  2a.  Associated right and left lower lobe atelectasis and fibrosis  demonstrates mild FDG avidity and likely represents post radiation  changes.  2b. Lung nodules - hypermetabolic, indeterminant, favor infection over  metastatic.  Several right middle lobe solid pulmonary nodules  including a 10 mm solid pulmonary nodule in the right middle lobe with  increased FDG uptake. New since 8/12/2023.  Indeterminant.  Differential includes infection vs. metastatic disease.  The localized  distribution in just the right middle lobe suggests infection however  the patient was afebrile with a normal white count yesterday.  Metastatic considerations melanoma, multiple myeloma (lung involvement  rare, RCC (<4% metastatic at <4cm).  2c. Trace right pleural effusion. This is nonspecific could be  cardiogenic, infectious, or malignant.    3. Probable renal cell carcinoma-Right lower pole renal 1.4 cm lesion  concerning for renal cell carcinoma unchanged from 8/12/2023.  4. Melanoma - Postexcisional changes in the superficial soft tissues  of the right buttocks from melanoma resection without evidence of  local recurrence.  5. Dental - Right mandibular focal FDG uptake, suspect dental issue.    I have personally reviewed the examination and initial interpretation  and I agree with the findings.    MELVIN FOSTER MD      SYSTEM ID:  [id number]       The longitudinal plan of care for the diagnosis(es)/condition(s) as documented were addressed during this visit. Due to the added complexity in care, I will continue to support Marshal in the subsequent management and with ongoing continuity of  care.      ------------------------------------------------------------------------------------------------------------------------------------------------    Patient Care Team         Relationship Specialty Notifications Start End    James Meyer MD PCP - General   10/17/23     Phone: [phone] Fax: [phone]         [address] [facility] [address]    Edita Roberson RN BMT Nurse Coordinator   12/22/23     [phone]    Jesus James MD BMT Physician Transplant  1/9/24     Phone: [phone] Fax: [phone]         [address] [place] [address] [address] [facility] [id number]    Brenda Golden, [facility]  BMT - Adult Admissions 1/10/24     Phone: [phone]

## 2024-02-22 NOTE — NURSING NOTE
"Oncology Rooming Note    February 22, 2024 1:02 PM   Dutch Mckeon is a 74 year old male who presents for:    Chief Complaint   Patient presents with    Oncology Clinic Visit     RTN for MM     Initial Vitals: BP (!) 146/77   Pulse 91   Temp 98.1  F (36.7  C) (Oral)   Resp 16   Wt 89.4 kg (197 lb)   SpO2 98%   BMI 27.55 kg/m   Estimated body mass index is 27.55 kg/m  as calculated from the following:    Height as of 10/20/23: 1.801 m (5' 10.91\").    Weight as of this encounter: 89.4 kg (197 lb). Body surface area is 2.11 meters squared.  No Pain (0) Comment: Data Unavailable   No LMP for male patient.  Allergies reviewed: Yes  Medications reviewed: Yes    Medications: Medication refills not needed today.  Pharmacy name entered into [employer]:    [facility] - [id number] - [place], [place] - [address] [place] [address]    Frailty Screening:   Is the patient here for a new oncology consult visit in cancer care? 2. No      Clinical concerns: none      Bre Shearer MA             "

## 2024-02-22 NOTE — LETTER
2/22/2024         RE: Dutch Mckeon  [address] [place] [address]        Dear Colleague,    Thank you for referring your patient, Dutch Mckeon, to the [facility]OD [facility]ARROW [facility]ANSPL[facility] [place]. Please see a copy of my visit note below.         BMT / Cell Therapy Close Note      Dutch Mckeon is a 74 year old male referred by Dr. Brown for multiple myeloma.      Disease presentation and baseline characteristics:   8/18/23: IgG lambda multiple myeloma, standard risk    Date Treatment Name Response Side Effects / Toxicities   9/12/23 D-VRd x 5 cycles VGPR     XRT to T5-T7            HPI:  Please see my entry above for hematologic history.  Mr. Mckeon presents today to review his pre-transplant workup.  He notes he still has a mild persistent cough but this is significantly improved over the past few weeks.  He denies fevers/chills, night sweats, lumps/bumps, unintentional weight loss, shortness of breath, chest pain, or other acute concerns.  He notes he was feeling sick up until he started levofloxacin and then felt better within a few days.  Energy and appetite are good.  He and his wife have some hesitation about proceeding with transplant and would like to discuss it in more detail.      ASSESSMENT AND PLAN:  We had an extended discussion regarding the role of transplant for multiple myeloma today.  We reviewed the results of the DETERMINATION (DOI: 10.1056/[id number]) trial, which showed a progression free survival benefit for transplant but no overall survival benefit.  This remains consistent with the prior results of the IFM 2009 trial (DOI: 10.1056/[id number]).    After extended discussion Mr. Mckeon and his wife opted against proceeding with transplant, as he would like to focus on quality of life for the next few years.  I think this is reasonable and would recommend he complete an additional 2-5 cycles of D-VRd before moving to maintenance with  lenalidomide.  Mr. Mckeon is eligible for transplant based on his workup, but I do not think transplant would be in line with his stated goals.    Omission of transplant does not make Mr. Mckeon ineligible for CAR-T or bispecific therapy at a later date, and he should be referred for these if/when eligible.    If Mr. Mckeon has an early relapse in the next few years please feel free to reach out to us to discuss possible clinical trials prior to starting new therapy.    No further follow up is planned with BMT; Mr. Mckeon will reach out to his primary oncologist to resume therapy.    Summary: Defer transplant indefinitely, recommend 2-4 additional cycles of D-VRd followed by lenalidomide maintenance    I spent 40 minutes in the care of this patient today, which included time necessary for preparation for the visit, obtaining history, ordering medications/tests/procedures as medically indicated, review of pertinent medical literature, counseling of the patient, communication of recommendations to the care team, and documentation time.    Paul Mayes MD    ROS:    10 point ROS neg other than the symptoms noted above in the HPI.      Past Medical History:   Diagnosis Date    Basal cell cancer     nose    Gout     Hyperlipidemia     Kidney stone     Type 2 diabetes mellitus without complications (H)        Past Surgical History:   Procedure Laterality Date    ENT SURGERY      tonsillectomy    EXCISE LESION HEAD Left 11/13/2015    Procedure: EXCISE LESION HEAD;  Surgeon: Amadeo Liu MD;  Location: [facility]    EXCISE LESION LOWER EXTREMITY  3/21/2014    Procedure: EXCISE LESION LOWER EXTREMITY;  EXCISION LESION RIGHT FOOT FOURTH WEB SPACE;  Surgeon: Amadeo Liu MD;  Location: [facility]    SOFT TISSUE SURGERY      mohs to nose       Social History     Tobacco Use    Smoking status: Never   Substance Use Topics    Alcohol use: Yes    Drug use: No         Allergies   Allergen Reactions    Blood  Transfusion Related (Informational Only) Other (See Comments)     Patient has a history of positive antibody screen due to Passive Drug Antibody (Anti-CD38).  A delay in compatible RBCs may ocur        Current Outpatient Medications   Medication Sig Dispense Refill    ACCU-CHEK GUIDE test strip TEST ONE STRIP THREE TIMES DAILY AS DIRECTED      acyclovir (ZOVIRAX) 400 MG tablet Take 1 tablet by mouth 2 times daily      allopurinol (ZYLOPRIM) 300 MG tablet       ALLOPURINOL PO Take 50 mg by mouth daily (Patient not taking: Reported on 1/10/2024)      ASPIRIN EC PO Take 81 mg by mouth daily      atorvastatin (LIPITOR) 80 MG tablet       ezetimibe (ZETIA) 10 MG tablet       gabapentin (NEURONTIN) 100 MG capsule Take 100 mg by mouth 3 times daily      glipiZIDE (GLUCOTROL) 5 MG tablet  (Patient not taking: Reported on 1/9/2024)      levofloxacin (LEVAQUIN) 750 MG tablet Take 1 tablet (750 mg) by mouth daily 7 tablet 0    METFORMIN HCL PO Take 1,000 mg by mouth 2 times daily (with meals)       multivitamin, therapeutic with minerals (MULTI-VITAMIN) TABS Take 1 tablet by mouth daily      senna (SENOKOT) 8.6 MG tablet Take 2 tablets at bedtime, but you can take up to 4 tablets if you need it      sulfamethoxazole-trimethoprim (BACTRIM/SEPTRA) 8 mg/mL suspension  (Patient not taking: Reported on 1/9/2024)      VITAMIN D, CHOLECALCIFEROL, PO Take 1,000 Units by mouth daily           Physical Exam:     Vital Signs: BP (!) 146/77   Pulse 91   Temp 98.1  F (36.7  C) (Oral)   Resp 16   Wt 89.4 kg (197 lb)   SpO2 98%   BMI 27.55 kg/m      KPS:  70  General Appearance: alert, and no distress  Eyes: PERRL, conjunctiva and lids normal, sclera nonicteric  Ears/Nose/M/Throat: Oral mucosa and posterior oropharynx normal, moist mucous membranes  Neck supple, non-tender, free range of motion, no adenopathy  Cardio/Vascular:regular rate and rhythm, normal S1 and S2, no murmur  Resp Effort And Auscultation: Normal - Clear to  auscultation without rales, rhonchi, or wheezing.  GI: soft, nontender, bowel sounds present in all four quadrants, no hepatosplenomegaly  Lymphatics:no significant enlargement of lymph nodes globally   Musculoskeletal: Musculoskeletal normal  Edema: none  Skin: Skin color, texture, turgor normal. No rashes or lesions.  Neurologic: Utilizes walker.  Sensation grossly WNL.  Psych/Affect: Mood and affect are appropriate.    Blood Counts     Recent Labs   Lab Test 01/10/24  1049 01/09/24  1310   HGB 12.4* 12.9*   HCT 37.9* 39.7*   WBC 4.8 4.3   ANEUTAUTO 3.2 3.0   ALYMPAUTO 1.1 0.9   AMONOAUTO 0.4 0.3   AEOSAUTO 0.1 0.1   ABSBASO 0.1 0.1   NRBCMAN 0.0 0.0    316       Chemistries     Basic Panel  Recent Labs   Lab Test 01/12/24  1146 01/09/24  1310   NA  --  140   POTASSIUM  --  4.3   CHLORIDE  --  103   CO2  --  25   BUN  --  12.1   CR 0.69 0.69   GLC  --  170*        Calcium, Magnesium, Phosphorus  Recent Labs   Lab Test 01/09/24  1310   LEANEN 9.8   MAG 1.9   PHOS 3.5        LFTs  Recent Labs   Lab Test 01/09/24  1310   BILITOTAL 0.6   ALKPHOS 95   AST 27   ALT 18   ALBUMIN 4.5       LDH  Recent Labs   Lab Test 01/09/24  1310          B2-Microglobulin  Recent Labs   Lab Test 01/09/24  1310   [id number] 2.1         Immunoglobulins     Recent Labs   Lab Test 01/09/24  1310   *       Recent Labs   Lab Test 01/09/24  1310   IGA 26*       Recent Labs   Lab Test 01/09/24  1310   IGM 25*         Monocloncal Protein Studies     M spike    Recent Labs   Lab Test 01/09/24  1310   ELPM 0.1*       Cassel FLC    Recent Labs   Lab Test 01/09/24  1310   KFLCA 0.65       Lambda FLC    Recent Labs   Lab Test 01/09/24  1310   LFLCA 0.37*       FLC Ratio    Recent Labs   Lab Test 01/09/24  1310   KLRA 1.76*       Bone Marrow Biopsy       Morphology    Results for orders placed or performed in visit on 01/10/24 (from the past 8760 hour(s))   Bone marrow biopsy   Result Value    Final Diagnosis      Bone marrow,  posterior iliac crest, right decalcified trephine biopsy and touch imprint; particle crush, direct aspirate smear, and concentrated aspirate smear; and peripheral smear:  - No morphologic or immunophenotypic evidence of recurrent/persistent plasma cell neoplasm  - Normocellular marrow (cellularity is estimated at 30-40%) with trilineage hematopoiesis, no overt dysplasia, no increase in blasts and <1% polytypic plasma cell.  - Peripheral blood showing slight normochromic normocytic anemia   - See comment       Comment      Flow cytometry analysis on concurrent specimen ([id number]) showed:  -Polytypic B cells  -Rare to absent plasma cells    Concurrent ancillary studies are in progress and will be reported separately.  Correlation with the results of protein immunofixation studies, serum protein electrophoresis and serum free light chain studies is recommended.      Clinical Information      Per Epic electronic medical records; 73 year old male who was diagnosed on 8/18/2023 at an outside hospital with IgG lambda plasma cell myeloma who is status post daratumumab, Velcade, Revlimid, dexamethasone. Protein serum electrophoresis done on 1/9/2024 show decreased total protein and a slightly elevated kappa/lambda ratio (1.76). He is undergoing evaluation for a bone marrow transplant.        Peripheral Hematologic Data      CBC WITH DIFFERENTIAL (01/10/2024 11:01 AM CST):     RESULT VALUE REF. RANGE UNITS   WBC Count   Hemoglobin    Hematocrit   Platelet Count   RBC Count   MCV  MCH  MCHC   RDW  4.8 (NORMAL)     12.4  ( L )      37.9  ( L )  304 (NORMAL)   4.32  ( L )       88 (NORMAL)     28.7 (NORMAL)     32.7 (NORMAL)      16.3  ( H ) 4.0-11.0  13.3-17.7  40.0-53.0  150-450  4.40-5.90    26.5-33.0  31.5-36.5  10.0-15.0 10e3/uL  g/dL  %  10e3/uL  10e6/uL  fL  pg  g/dL  %   % Neutrophils  % Lymphocytes  % Monocytes  % Eosinophils  % Basophils  % Immature Granulocytes  Absolute Neutrophils  Absolute  Lymphocytes  Absolute Monocytes  Absolute Eosinophils  Absolute Basophils  Absolute Immature Granulocytes  NRBCs per 100 WBC  Absolute NRBCs 65  22  8  2  2  1  3.2 (NORMAL)  1.1 (NORMAL)  0.4 (NORMAL)  0.1 (NORMAL)  0.1 (NORMAL)  0.0 (NORMAL)  0 (NORMAL)  0.0 () N/A  N/A  N/A  N/A  N/A  N/A  1.6-8.3  0.8-5.3  0.0-1.3  0.0-0.7  0.0-0.2  <=0.4  <1  <=0.0 %  %  %  %  %  %  10e3/uL  10e3/uL  10e3/uL  10e3/uL  10e3/uL  10e3/uL  /100  10e3/uL       Microscopic Description      PERIPHERAL BLOOD SMEAR MORPHOLOGY:  The red blood cells appear normochromic. Poikilocytosis  includes occasional elliptocytes, occasional echinocytes, rare fragmented red blood cells and rare bite cell. Polychromasia is not increased. Rouleaux formation is not increased. Lymphocyte morphology is polymorphous, with predominantly small and mature forms. Neutrophils display predominantly normal cytoplasmic granularity and unremarkable nuclear morphology. The morphology of the platelets unremarkable.    Bone marrow aspirates and bone marrow trephine core biopsy touch imprints are reviewed.    BONE MARROW DIFFERENTIAL (500 cells on bone marrow biopsy touch imprints )  Percent Cell (reference range)  0 Blasts (0 - 1)  1 Neutrophil promyelocytes (2 - 4)  54 Neutrophils and precursors (54 - 63)  37 Erythroid precursors (18 - 24)  1 Monocytes (1 - 1.5)  3 Eosinophils (1 - 3)  0 Basophils (0 - 1)  4 Lymphocytes (8 - 12)  0 Plasma cells (0 - 1.5)    The aspirate smears are hemodilute, therefore the above differential is performed on the bone marrow trephine core biopsy touch imprints..    Granulocytes are adequate in number with progressive and complete maturation; no overt dysplasia seen. Erythrocytes are relatively increased in number with progressive and complete maturation; no overt dysplasia seen. Megakaryocytes with unremarkable morphology are present.     Particle crush slides are reviewed and do not significantly differ from the aspirate smears or  touch imprints.    IRON STAINS:  Dacie iron stain on concentrate smears:  Iron stains show 10% sideroblasts. No ring sideroblasts are seen on scanning.     Prussian blue stain on particle crush:   Marrow iron stores are reduced.    TREPHINE SECTIONS:  Hematoxylin and eosin stains are reviewed. The quality of the trephine core biopsy is adequate with slight fragmentation. The marrow cellularity is estimated at 30-40%. The cellular composition reflects the differential. It shows trilineage hematopoiesis. Megakaryocytes are present and scattered throughout the marrow with unremarkable morphology and distribution. There is no atypical infiltration of plasma cells.     IMMUNOHISTOCHEMISTRY:  Immunohistochemical stains are performed on the paraffin-embedded trephine core with appropriate controls.    Stains for , cytoplasmic kappa and lambda immunoglobulin light chains show scattered polytypic plasma cells comprising <1% of marrow cellularity; no clusters or aggregates of plasma cells noted.    Note: These immunohistochemical stains are deemed medically necessary. Some of the antigens may also be evaluated by flow cytometry. Concurrent evaluation by immunohistochemistry on clot and/or trephine sections is indicated in this case in order to correlate immunophenotype with cell morphology and determine extent of involvement, spatial pattern, and focality of potential disease distribution.    Case was reviewed by the following:  Resident Pathologist: Rizwana South MD  A resident or fellow in a training program was involved in the initial review, preparation, and/or interpretation of this case.  I, as the senior physician, attest that I have personally reviewed all specimens and or slides, including the listed special stains, and used them with my medical judgement to determine the final diagnosis.              Gross Description      Procedure/Gross Description   Aspirate(s) and trephine(s) procured by MELQUIADES Varghese  PA    Specimen sent for Special Studies:         Flow Cytometry: right aspirate        Cytogenetics: right aspirate        Molecular Diagnostics: right aspirate          Biopsy aspiration site: right posterior iliac crest                                                      (Reference Range)          Amount of aspirate           3.0   mL        Fat and P.V. cell layer        1    %               (1 - 3)        Particles                             trace   %        Myeloid-erythroid layer    1    %               (5 - 8)          Clot Section: no    Trephine biopsy site: right posterior iliac crest    Designated right posterior iliac crest is 1 cylinder of gritty tissue, labeled with the patient's name and hospital number, obtained with 11 gauge needle and a length of 14 mm; entirely submitted in 1 cassette; acetic zinc formalin fixed, decalcified, processed, and stained for hematoxylin and eosin per laboratory protocol.        Performing Labs      The technical component of this testing was completed at [facility]           Echocardiogram       Results for orders placed during the hospital encounter of 24    ECHOCARDIOGRAM COMPLETE    Status: Normal 2024    Narrative  [id number]  [id number]  [id number]  [id number]^LUIGI^GABRIEL^[place]    [facility]  Echocardiography Laboratory  [address] [place] [id number]    Name: MACI WHITE  MRN: [id number]  : 1950  Study Date: 2024 09:22 AM  Age: 73 yrs  Gender: Male  Patient Location: [facility]  Reason For Study: Multiple myeloma in remission (H), Baseline for Chemo  Ordering Physician: GABRIEL MEYERS  Referring Physician: GABRIEL MEYERS  Performed By: Naila Beth RDCS    BSA: 2.1 m2  Height: 70 in  Weight: 192 lb  HR: 78  BP: 124/62  mmHg  ______________________________________________________________________________  Procedure  Echocardiogram with two-dimensional, color and spectral Doppler performed. 3D  image acquisition, reconstruction, and real-time interpretation was performed.  ______________________________________________________________________________  Interpretation Summary  Global and regional left ventricular function is normal with an EF of 55-60%.  Global right ventricular function is normal.  Mild mitral insufficiency is present.  Estimated mean right atrial pressure is normal.  Trivial pericardial effusion is present.    ______________________________________________________________________________  Left Ventricle  Left ventricular size is normal. Left ventricular wall thickness is normal.  Global and regional left ventricular function is normal with an EF of 55-60%.  Global peak LV longitudinal strain is averaged at -17%. This is within  reported normal limits (low-normal). No regional wall motion abnormalities are  seen.    Right Ventricle  The right ventricle is normal size. Global right ventricular function is  normal.    Atria  Both atria appear normal.    Mitral Valve  Mild mitral insufficiency is present.    Aortic Valve  The aortic valve is tricuspid. Mild aortic valve calcification is present. No  aortic stenosis is present.    Tricuspid Valve  Trace to mild tricuspid insufficiency is present. The peak velocity of the  tricuspid regurgitant jet is not obtainable. Pulmonary artery systolic  pressure cannot be assessed.    Pulmonic Valve  The pulmonic valve is normal.    Vessels  The aorta root is normal. The inferior vena cava is normal. Estimated mean  right atrial pressure is normal.    Pericardium  Trivial pericardial effusion is present.    Compared to Previous Study  Previous study not available for comparison.  ______________________________________________________________________________  MMode/2D Measurements &  Calculations  IVSd: 0.84 cm    LVIDd: 5.2 cm  LVIDs: 3.2 cm  LVPWd: 0.78 cm  FS: 37.7 %  LV mass(C)d: 145.4 grams  LV mass(C)dI: 70.9 grams/m2  Ao root diam: 3.7 cm  asc Aorta Diam: 3.8 cm  LVOT diam: 2.6 cm  LVOT area: 5.4 cm2  Ao root diam index Ht(cm/m): 2.1  Ao root diam index BSA (cm/m2): 1.8  Asc Ao diam index BSA (cm/m2): 1.8  Asc Ao diam index Ht(cm/m): 2.1  LA Volume (BP): 50.4 ml    LA Volume Index (BP): 24.6 ml/m2  RV Base: 4.4 cm  RWT: 0.30  TAPSE: 2.6 cm    Doppler Measurements & Calculations  MV E max joe: 58.7 cm/sec  MV A max joe: 68.7 cm/sec  MV E/A: 0.86  MV dec slope: 231.5 cm/sec2  MV dec time: 0.25 sec  LV V1 max PG: 3.4 mmHg  LV V1 max: 91.7 cm/sec  LV V1 VTI: 18.6 cm  SV(LVOT): 101.3 ml  SI(LVOT): 49.4 ml/m2  PA acc time: 0.06 sec  E/E' av.0  Lateral E/e': 6.5  Medial E/e': 7.4  RV S Joe: 15.4 cm/sec    Measurements from [facility]  LV GLS Endo Peak A2C (AS): -19.0 %  LV GLS Endo Peak A3C (AS): -18.1 %  LV GLS Endo Peak A4C (AS): -14.8 %  LV GLS Endo Peak Avg (AS): -17.3 %  ______________________________________________________________________________  Report approved by: Joselyn Hickey 2024 11:52 AM        PET Scan       Results for orders placed during the hospital encounter of 24    PET ONCOLOGY WHOLE BODY    Status: Normal 2024    Narrative  Combined Report of: PET and CT on 2024 9:13 AM:    1. PET of the neck, chest, abdomen, and pelvis.  2. PET CT Fusion for Attenuation Correction and Anatomical  Localization.  3. Diagnostic CT of the chest, abdomen and pelvis with intravenous  contrast obtained for diagnostic interpretation.  4. 3D MIP and PET-CT fused images were processed on an independent  workstation and archived to PACS and reviewed by a radiologist.    Technique:    1. PET: The patient received 11.5 to mCi of F-18-FDG. The serum  glucose was 105 prior to administration. Body weight was 87 kg. Images  were evaluated in the axial, sagittal, and coronal planes  as well as  the rotational whole body MIP. Images were acquired from the cranial  vertex to the feet.    UPTAKE WAS MEASURED AT 60 MINUTES.    BACKGROUND: Liver SUV max = 2.58, Aorta Blood SUV Max = 2.44.    2. CT: Volumetric acquisition for clinical interpretation of the  chest, abdomen, and pelvis acquired at 3 mm sections. The chest,  abdomen, and pelvis were evaluated at 5 mm sections in bone, soft  tissue, and lung windows.    Contrast and Medications:  IV contrast: None.  PO contrast: none.  Additional Medications: None.    3. 3D MIP and PET-CT fused images were processed on an independent  workstation and archived to PACS and reviewed by a radiologist.    INDICATION: myeloma bmt reina; Multiple myeloma in remission (H);  Examination prior to chemotherapy; Personal history of diseases of  blood and blood-forming organs; Vitamin D deficiency.    ADDITIONAL INFORMATION OBTAINED FROM EMR: Dutch Mckeon?is a 73  year old?year old male?diagnosed with multiple myeloma.??he?has been  treated with D-RVd.??he?is now being work up for autologous?Stem Cell  Transplant . 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  Melanoma right buttock lesion in 10/2023.    COMPARISON: Outside reports 8/12/2023.      FINDINGS:    HEAD/NECK:  Focal uptake right mandible posteriorly.  The paranasal sinuses are  clear. The mastoid air cells are clear.    No hypermetabolic lymph nodes are demonstrated in the neck.    The mucosal and deep spaces of the neck are unremarkable. The major  salivary glands are unremarkable. The thyroid is unremarkable. The  major vasculature of the neck are patent.    CHEST:  Within the posterior elements of T6 involving the posterior elements  of T5 and T7 as well as the paraspinous and adjacent ribs there is a  soft tissue density lesion with mildly increased FDG of the with a max  SUV of 2.8 (series 4 image 138). The lesion measures 5.7 x 3.9 cm (6.8  x 5.4 cm a 1223) There is adjacent bronchiectasis  and atelectasis in  the right and left lower lobes and right upper lobe.    Small right pleural effusion. In the right middle lobe there is a  solid pulmonary nodule measuring 11 x 8 mm (series 4 image 171) with a  max SUV of 4.63. There are nearby solid pulmonary nodules including an  11 x 4 mm solid nodule (series 4 image 170) with an FDG avidity of  1.55 and a 6 x 6 mm part solid part groundglass pulmonary nodule  (series 4 image 167) with FDG avidity of 1.95.    The central tracheobronchial tree is clear. No pneumothorax. No acute  consolidation.    No hypermetabolic lymph nodes are demonstrated in the chest.    Heart size is within normal limits. No pericardial effusion. The  thoracic aorta and main pulmonary artery are within normal limits for  diameter. The esophagus is unremarkable.    ABDOMEN AND PELVIS:  There is no suspicious FDG uptake in the abdomen or pelvis.    The liver is unremarkable. The gallbladder is unremarkable. No  intrahepatic or extrahepatic biliary ductal dilatation. The pancreas  is unremarkable. No pancreatic ductal dilatation. The spleen is  unremarkable. The adrenal glands are unremarkable.    Bilateral fluid attenuating simple renal cyst without FDG avidity. In  the right lower pole, there is an enhancing lesion (8/12/2023 contrast  study) measure 1.4 cm max SUV 2.5, highly concerning for renal cell  carcinoma. No hydronephrosis. The urinary bladder is unremarkable. The  reproductive organs are unremarkable.    No hypermetabolic lymph nodes are demonstrated in the abdomen or  pelvis.    Normal caliber of the small and large bowel. Diffusely increased  uptake throughout the large bowel is related to metformin use. No free  air, free fluid, or fluid collection. Normal caliber of the abdominal  aorta.    LOWER EXTREMITIES:  There is no suspicious FDG uptake in the visualized lower extremities.  Focal uptake in the intercondylar notch suggestive of a  ligamentous  injury.    BONES:  Increased activity at the left pubic tubercle and about the right and  left hip are degenerative in nature.    Soft tissues: Mild fat stranding increased uptake in the superficial  soft tissues overlying the right buttock (series 4 image 273) which  likely represents posttreatment changes of local excision.    Impression  IMPRESSION:    In this patient with history of multiple myeloma, T6-7 plasmacytoma,  and melanoma :  1. Plasmacytoma - Ill-defined lytic plasmacytoma T6 lesion 5.7 cm  (prior 8/12/2020 6.8 cm) centered about the posterior elements  replacing most/all of the spinal canal with mild FDG uptake (threshold  between normal and active disease, Max SUV 2.8, similar to liver)).    2. Lungs -  2a.  Associated right and left lower lobe atelectasis and fibrosis  demonstrates mild FDG avidity and likely represents post radiation  changes.  2b. Lung nodules - hypermetabolic, indeterminant, favor infection over  metastatic.  Several right middle lobe solid pulmonary nodules  including a 10 mm solid pulmonary nodule in the right middle lobe with  increased FDG uptake. New since 8/12/2023.  Indeterminant.  Differential includes infection vs. metastatic disease.  The localized  distribution in just the right middle lobe suggests infection however  the patient was afebrile with a normal white count yesterday.  Metastatic considerations melanoma, multiple myeloma (lung involvement  rare, RCC (<4% metastatic at <4cm).  2c. Trace right pleural effusion. This is nonspecific could be  cardiogenic, infectious, or malignant.    3. Probable renal cell carcinoma-Right lower pole renal 1.4 cm lesion  concerning for renal cell carcinoma unchanged from 8/12/2023.  4. Melanoma - Postexcisional changes in the superficial soft tissues  of the right buttocks from melanoma resection without evidence of  local recurrence.  5. Dental - Right mandibular focal FDG uptake, suspect dental issue.    I have  personally reviewed the examination and initial interpretation  and I agree with the findings.    MELVIN FOSTER MD      SYSTEM ID:  [id number]       The longitudinal plan of care for the diagnosis(es)/condition(s) as documented were addressed during this visit. Due to the added complexity in care, I will continue to support Marshal in the subsequent management and with ongoing continuity of care.      ------------------------------------------------------------------------------------------------------------------------------------------------    Patient Care Team         Relationship Specialty Notifications Start End    James Meyer MD PCP - General   10/17/23     Phone: [phone] Fax: [phone]         [address]  [facility] [address]    Edita Roberson, RN BMT Nurse Coordinator   12/22/23     [phone]    Jesus James MD BMT Physician Transplant  1/9/24     Phone: [phone] Fax: [phone]         [address] [place] [address] [address] [facility] [address]    Brenda Golden [facility]  BMT - Adult Admissions 1/10/24     Phone: [phone]                B[facility] [employer]

## 2024-04-13 ENCOUNTER — HEALTH MAINTENANCE LETTER (OUTPATIENT)
Age: 74
End: 2024-04-13

## 2024-08-31 ENCOUNTER — HEALTH MAINTENANCE LETTER (OUTPATIENT)
Age: 74
End: 2024-08-31

## 2025-01-04 ENCOUNTER — HEALTH MAINTENANCE LETTER (OUTPATIENT)
Age: 75
End: 2025-01-04

## 2025-04-19 ENCOUNTER — HEALTH MAINTENANCE LETTER (OUTPATIENT)
Age: 75
End: 2025-04-19

## 2025-08-02 ENCOUNTER — HEALTH MAINTENANCE LETTER (OUTPATIENT)
Age: 75
End: 2025-08-02